# Patient Record
Sex: MALE | Race: WHITE | NOT HISPANIC OR LATINO | Employment: FULL TIME | URBAN - METROPOLITAN AREA
[De-identification: names, ages, dates, MRNs, and addresses within clinical notes are randomized per-mention and may not be internally consistent; named-entity substitution may affect disease eponyms.]

---

## 2018-05-14 ENCOUNTER — HOSPITAL ENCOUNTER (OUTPATIENT)
Facility: HOSPITAL | Age: 53
Setting detail: OBSERVATION
Discharge: HOME | End: 2018-05-14
Attending: STUDENT IN AN ORGANIZED HEALTH CARE EDUCATION/TRAINING PROGRAM | Admitting: INTERNAL MEDICINE
Payer: COMMERCIAL

## 2018-05-14 ENCOUNTER — ANESTHESIA EVENT (OUTPATIENT)
Dept: ENDOSCOPY | Facility: HOSPITAL | Age: 53
Setting detail: OBSERVATION
End: 2018-05-14
Payer: COMMERCIAL

## 2018-05-14 ENCOUNTER — APPOINTMENT (EMERGENCY)
Dept: RADIOLOGY | Facility: HOSPITAL | Age: 53
End: 2018-05-14
Attending: STUDENT IN AN ORGANIZED HEALTH CARE EDUCATION/TRAINING PROGRAM
Payer: COMMERCIAL

## 2018-05-14 VITALS
BODY MASS INDEX: 34.88 KG/M2 | OXYGEN SATURATION: 98 % | HEART RATE: 108 BPM | RESPIRATION RATE: 18 BRPM | TEMPERATURE: 97.8 F | WEIGHT: 280.5 LBS | SYSTOLIC BLOOD PRESSURE: 154 MMHG | DIASTOLIC BLOOD PRESSURE: 89 MMHG | HEIGHT: 75 IN

## 2018-05-14 DIAGNOSIS — I48.19 PERSISTENT ATRIAL FIBRILLATION (CMS/HCC): ICD-10-CM

## 2018-05-14 DIAGNOSIS — I48.91 ATRIAL FIBRILLATION WITH RVR (CMS/HCC): Primary | ICD-10-CM

## 2018-05-14 DIAGNOSIS — I10 ESSENTIAL HYPERTENSION: Chronic | ICD-10-CM

## 2018-05-14 PROBLEM — E78.5 HYPERLIPEMIA: Chronic | Status: ACTIVE | Noted: 2018-05-14

## 2018-05-14 PROBLEM — E87.6 HYPOKALEMIA: Status: ACTIVE | Noted: 2018-05-14

## 2018-05-14 LAB
ANION GAP SERPL CALC-SCNC: 8 MEQ/L (ref 3–15)
APTT PPP: 36 SEC. (ref 23–35)
ATRIAL RATE: 141
BASOPHILS # BLD: 0.05 K/UL (ref 0.01–0.1)
BASOPHILS NFR BLD: 0.5 %
BUN SERPL-MCNC: 25 MG/DL (ref 8–20)
CALCIUM SERPL-MCNC: 9.3 MG/DL (ref 8.9–10.3)
CHLORIDE SERPL-SCNC: 104 MMOL/L (ref 98–109)
CO2 SERPL-SCNC: 26 MMOL/L (ref 22–32)
CREAT SERPL-MCNC: 0.9 MG/DL (ref 0.8–1.3)
D DIMER PPP IA.FEU-MCNC: <0.27 ÆG/ML (ref 0–0.5)
DIFFERENTIAL METHOD BLD: NORMAL
EOSINOPHIL # BLD: 0.28 K/UL (ref 0.04–0.54)
EOSINOPHIL NFR BLD: 3 %
ERYTHROCYTE [DISTWIDTH] IN BLOOD BY AUTOMATED COUNT: 13.4 % (ref 11.6–14.4)
GFR SERPL CREATININE-BSD FRML MDRD: >60 ML/MIN/1.73M*2
GLUCOSE SERPL-MCNC: 131 MG/DL (ref 70–99)
HCT VFR BLDCO AUTO: 44.8 % (ref 40–51)
HGB BLD-MCNC: 15.3 G/DL (ref 13.7–17.5)
IMM GRANULOCYTES # BLD AUTO: 0.03 K/UL (ref 0–0.08)
IMM GRANULOCYTES NFR BLD AUTO: 0.3 %
INR PPP: 0.9 INR
LYMPHOCYTES # BLD: 3.27 K/UL (ref 1.2–3.5)
LYMPHOCYTES NFR BLD: 34.5 %
MCH RBC QN AUTO: 29.7 PG (ref 28–33.2)
MCHC RBC AUTO-ENTMCNC: 34.2 G/DL (ref 32.2–36.5)
MCV RBC AUTO: 87 FL (ref 83–98)
MONOCYTES # BLD: 0.89 K/UL (ref 0.3–1)
MONOCYTES NFR BLD: 9.4 %
NEUTROPHILS # BLD: 4.96 K/UL (ref 1.7–7)
NEUTS SEG NFR BLD: 52.3 %
NRBC BLD-RTO: 0 %
PDW BLD AUTO: 9.6 FL (ref 9.4–12.4)
PLATELET # BLD AUTO: 235 K/UL (ref 150–350)
POTASSIUM SERPL-SCNC: 3.2 MMOL/L (ref 3.6–5.1)
PROTHROMBIN TIME: 12 SEC. (ref 12.2–14.5)
QRS DURATION: 94
QT INTERVAL: 270
QTC CALCULATION(BAZETT): 418
R AXIS: -1
RBC # BLD AUTO: 5.15 M/UL (ref 4.5–5.8)
SODIUM SERPL-SCNC: 138 MMOL/L (ref 136–144)
T WAVE AXIS: 19
TROPONIN I SERPL-MCNC: <0.02 NG/ML
TROPONIN I SERPL-MCNC: <0.02 NG/ML
TSH SERPL DL<=0.05 MIU/L-ACNC: 2.51 UIU/ML (ref 0.34–5.6)
VENTRICULAR RATE: 144
WBC # BLD AUTO: 9.48 K/UL (ref 3.8–10.5)

## 2018-05-14 PROCEDURE — 36415 COLL VENOUS BLD VENIPUNCTURE: CPT | Performed by: INTERNAL MEDICINE

## 2018-05-14 PROCEDURE — 63700000 HC SELF-ADMINISTRABLE DRUG: Performed by: NURSE PRACTITIONER

## 2018-05-14 PROCEDURE — 99217 PR OBSERVATION CARE DISCHARGE MANAGEMENT: CPT | Performed by: HOSPITALIST

## 2018-05-14 PROCEDURE — 25800000 HC PHARMACY IV SOLUTIONS: Performed by: STUDENT IN AN ORGANIZED HEALTH CARE EDUCATION/TRAINING PROGRAM

## 2018-05-14 PROCEDURE — 85610 PROTHROMBIN TIME: CPT | Performed by: STUDENT IN AN ORGANIZED HEALTH CARE EDUCATION/TRAINING PROGRAM

## 2018-05-14 PROCEDURE — 36415 COLL VENOUS BLD VENIPUNCTURE: CPT | Performed by: STUDENT IN AN ORGANIZED HEALTH CARE EDUCATION/TRAINING PROGRAM

## 2018-05-14 PROCEDURE — 85730 THROMBOPLASTIN TIME PARTIAL: CPT | Performed by: STUDENT IN AN ORGANIZED HEALTH CARE EDUCATION/TRAINING PROGRAM

## 2018-05-14 PROCEDURE — 92960 CARDIOVERSION ELECTRIC EXT: CPT | Performed by: INTERNAL MEDICINE

## 2018-05-14 PROCEDURE — 93005 ELECTROCARDIOGRAM TRACING: CPT | Performed by: STUDENT IN AN ORGANIZED HEALTH CARE EDUCATION/TRAINING PROGRAM

## 2018-05-14 PROCEDURE — 84443 ASSAY THYROID STIM HORMONE: CPT | Performed by: STUDENT IN AN ORGANIZED HEALTH CARE EDUCATION/TRAINING PROGRAM

## 2018-05-14 PROCEDURE — G0378 HOSPITAL OBSERVATION PER HR: HCPCS

## 2018-05-14 PROCEDURE — 96365 THER/PROPH/DIAG IV INF INIT: CPT

## 2018-05-14 PROCEDURE — 85379 FIBRIN DEGRADATION QUANT: CPT | Performed by: STUDENT IN AN ORGANIZED HEALTH CARE EDUCATION/TRAINING PROGRAM

## 2018-05-14 PROCEDURE — 84484 ASSAY OF TROPONIN QUANT: CPT | Performed by: STUDENT IN AN ORGANIZED HEALTH CARE EDUCATION/TRAINING PROGRAM

## 2018-05-14 PROCEDURE — 25000000 HC PHARMACY GENERAL: Performed by: NURSE PRACTITIONER

## 2018-05-14 PROCEDURE — 200200 PR NO CHARGE: Performed by: HOSPITALIST

## 2018-05-14 PROCEDURE — 71045 X-RAY EXAM CHEST 1 VIEW: CPT

## 2018-05-14 PROCEDURE — 99285 EMERGENCY DEPT VISIT HI MDM: CPT | Mod: 25

## 2018-05-14 PROCEDURE — 84484 ASSAY OF TROPONIN QUANT: CPT | Mod: 91 | Performed by: INTERNAL MEDICINE

## 2018-05-14 PROCEDURE — 25000000 HC PHARMACY GENERAL: Performed by: STUDENT IN AN ORGANIZED HEALTH CARE EDUCATION/TRAINING PROGRAM

## 2018-05-14 PROCEDURE — 80048 BASIC METABOLIC PNL TOTAL CA: CPT | Performed by: STUDENT IN AN ORGANIZED HEALTH CARE EDUCATION/TRAINING PROGRAM

## 2018-05-14 PROCEDURE — 85025 COMPLETE CBC W/AUTO DIFF WBC: CPT | Performed by: STUDENT IN AN ORGANIZED HEALTH CARE EDUCATION/TRAINING PROGRAM

## 2018-05-14 PROCEDURE — 37000001 HC ANESTHESIA GENERAL: Performed by: INTERNAL MEDICINE

## 2018-05-14 PROCEDURE — 96366 THER/PROPH/DIAG IV INF ADDON: CPT | Mod: 59

## 2018-05-14 PROCEDURE — 96376 TX/PRO/DX INJ SAME DRUG ADON: CPT | Mod: 59

## 2018-05-14 PROCEDURE — 93005 ELECTROCARDIOGRAM TRACING: CPT | Mod: 59 | Performed by: INTERNAL MEDICINE

## 2018-05-14 PROCEDURE — 99220 PR INITIAL OBSERVATION CARE/DAY 70 MINUTES: CPT | Performed by: INTERNAL MEDICINE

## 2018-05-14 RX ORDER — POTASSIUM CHLORIDE 750 MG/1
40 TABLET, FILM COATED, EXTENDED RELEASE ORAL ONCE
Status: COMPLETED | OUTPATIENT
Start: 2018-05-14 | End: 2018-05-14

## 2018-05-14 RX ORDER — HYDROCHLOROTHIAZIDE 12.5 MG/1
12.5 CAPSULE ORAL EVERY MORNING
Refills: 3 | COMMUNITY
Start: 2018-03-11 | End: 2019-04-24

## 2018-05-14 RX ORDER — CLINDAMYCIN PHOSPHATE 10 MG/G
GEL TOPICAL
Refills: 3 | COMMUNITY
Start: 2018-05-07 | End: 2019-04-24

## 2018-05-14 RX ORDER — ATORVASTATIN CALCIUM 40 MG/1
40 TABLET, FILM COATED ORAL
Refills: 2 | COMMUNITY
Start: 2018-03-12

## 2018-05-14 RX ORDER — DILTIAZEM HCL IN NACL,ISO-OSM 125 MG/125
5-15 PLASTIC BAG, INJECTION (ML) INTRAVENOUS
Status: DISCONTINUED | OUTPATIENT
Start: 2018-05-14 | End: 2018-05-14

## 2018-05-14 RX ORDER — ATORVASTATIN CALCIUM 40 MG/1
40 TABLET, FILM COATED ORAL
Status: DISCONTINUED | OUTPATIENT
Start: 2018-05-14 | End: 2018-05-14 | Stop reason: HOSPADM

## 2018-05-14 RX ORDER — AMLODIPINE BESYLATE 5 MG/1
5 TABLET ORAL
Refills: 3 | COMMUNITY
Start: 2018-05-07

## 2018-05-14 RX ORDER — DEXTROSE 50 % IN WATER (D50W) INTRAVENOUS SYRINGE
25 AS NEEDED
Status: DISCONTINUED | OUTPATIENT
Start: 2018-05-14 | End: 2018-05-14 | Stop reason: HOSPADM

## 2018-05-14 RX ORDER — DEXTROSE 40 %
15-30 GEL (GRAM) ORAL AS NEEDED
Status: DISCONTINUED | OUTPATIENT
Start: 2018-05-14 | End: 2018-05-14 | Stop reason: HOSPADM

## 2018-05-14 RX ORDER — CLINDAMYCIN PHOSPHATE 10 MG/G
GEL TOPICAL 2 TIMES DAILY
Status: DISCONTINUED | OUTPATIENT
Start: 2018-05-14 | End: 2018-05-14

## 2018-05-14 RX ORDER — ENOXAPARIN SODIUM 100 MG/ML
1 INJECTION SUBCUTANEOUS
Status: DISCONTINUED | OUTPATIENT
Start: 2018-05-14 | End: 2018-05-14

## 2018-05-14 RX ORDER — AMLODIPINE BESYLATE 5 MG/1
5 TABLET ORAL DAILY
Status: DISCONTINUED | OUTPATIENT
Start: 2018-05-14 | End: 2018-05-14

## 2018-05-14 RX ORDER — DILTIAZEM HYDROCHLORIDE 5 MG/ML
20 INJECTION INTRAVENOUS ONCE
Status: COMPLETED | OUTPATIENT
Start: 2018-05-14 | End: 2018-05-14

## 2018-05-14 RX ORDER — IBUPROFEN 200 MG
16-32 TABLET ORAL AS NEEDED
Status: DISCONTINUED | OUTPATIENT
Start: 2018-05-14 | End: 2018-05-14 | Stop reason: HOSPADM

## 2018-05-14 RX ORDER — DILTIAZEM HCL IN NACL,ISO-OSM 125 MG/125
10 PLASTIC BAG, INJECTION (ML) INTRAVENOUS
Status: DISCONTINUED | OUTPATIENT
Start: 2018-05-14 | End: 2018-05-14

## 2018-05-14 RX ORDER — MOMETASONE FUROATE 1 MG/G
CREAM TOPICAL
Refills: 0 | COMMUNITY
Start: 2018-04-15 | End: 2019-04-24

## 2018-05-14 RX ADMIN — Medication 15 MG/HR: at 08:21

## 2018-05-14 RX ADMIN — DILTIAZEM HYDROCHLORIDE 20 MG: 5 INJECTION INTRAVENOUS at 03:21

## 2018-05-14 RX ADMIN — POTASSIUM CHLORIDE 40 MEQ: 750 TABLET, FILM COATED, EXTENDED RELEASE ORAL at 11:24

## 2018-05-14 RX ADMIN — SODIUM CHLORIDE 1000 ML: 9 INJECTION, SOLUTION INTRAVENOUS at 03:17

## 2018-05-14 RX ADMIN — Medication 15 MG/HR: at 10:26

## 2018-05-14 RX ADMIN — Medication 5 MG/HR: at 03:20

## 2018-05-14 ASSESSMENT — COGNITIVE AND FUNCTIONAL STATUS - GENERAL
HELP NEEDED FOR BATHING: 4 - NONE
MOVING TO AND FROM BED TO CHAIR: 4 - NONE
TOILETING: 4 - NONE
CLIMB 3 TO 5 STEPS WITH RAILING: 4 - NONE
DRESSING REGULAR UPPER BODY CLOTHING: 4 - NONE
WALKING IN HOSPITAL ROOM: 4 - NONE
HELP NEEDED FOR PERSONAL GROOMING: 4 - NONE
DRESSING REGULAR LOWER BODY CLOTHING: 4 - NONE
STANDING UP FROM CHAIR USING ARMS: 4 - NONE
EATING MEALS: 4 - NONE

## 2018-05-14 ASSESSMENT — ENCOUNTER SYMPTOMS
NECK PAIN: 0
DYSRHYTHMIAS: 1
HEMATURIA: 0
DIZZINESS: 0
DYSURIA: 0
FEVER: 0
VOMITING: 0
SHORTNESS OF BREATH: 0
SORE THROAT: 0
DIARRHEA: 0
HEADACHES: 0
COUGH: 0
BACK PAIN: 0
WEAKNESS: 0
RHINORRHEA: 0
NAUSEA: 0
EYE PAIN: 0
PALPITATIONS: 1
ABDOMINAL PAIN: 0
CHILLS: 0

## 2018-05-14 ASSESSMENT — PAIN SCALES - GENERAL: PAIN_LEVEL: 0

## 2018-05-14 NOTE — NURSING NOTE
Pt discharged to home, discharge instructions reviewed with pt, prescription and medication provided to pt, pt verbalized understanding of all instructions. IV access and monitor removed from pt

## 2018-05-14 NOTE — ASSESSMENT & PLAN NOTE
Admitted to telemetry in rapid afib with diltiazem infusion  TSH within normal limits  D/w Dr. May -plan was for cardioversion however patient converted to normal sinus rhythm without intervention.  Recommend anticoagulation for 1 month, cardiology will provide patient with Xarelto  Patient stable for discharge home will follow up with Dr. May for outpatient echocardiogram

## 2018-05-14 NOTE — ASSESSMENT & PLAN NOTE
Admit to telemetry and continue monitoring  Monitor BP per routine  TSH within normal limits  Will continue rate control with diltiazem infusion.   Cardiology consult  ECHO in the morning  NPO for possible CORTEZ/CV  Lovenox for anticoagulation at this time

## 2018-05-14 NOTE — ANESTHESIA POSTPROCEDURE EVALUATION
Patient: Usman Gottlieb    Procedure Summary     Date:  05/14/18 Room / Location:  [facility] GI 3 / [facility]    Anesthesia Start:  1216 Anesthesia Stop:  1223    Procedure:  CARDIOVERSION (N/A Chest) Diagnosis:  (a-fib)    Provider:  Ezequiel May MD Responsible Provider:  Ana Mantilla MD    Anesthesia Type:  general ASA Status:  2 - Emergent          Anesthesia Type: general  PACU Vitals     No data found.            Anesthesia Post Evaluation    Pain score: 0  Pain management: adequate  Patient participation: complete - patient participated  Level of consciousness: awake and alert  Cardiovascular status: acceptable  Airway Patency: adequate  Respiratory status: acceptable  Hydration status: acceptable  Anesthetic complications: no

## 2018-05-14 NOTE — PROGRESS NOTES
5/14/2018- RN- CC- Pt is scheduled for cardioversion today. Spoke with spouse who confimred pharmacy information. Xarelto will be given by Dr. Green office x 1 month. ORLIN Ann RN pager [phone]

## 2018-05-14 NOTE — ASSESSMENT & PLAN NOTE
He presents in new onset AF that began at 1:30am today. His CHADS2 VASC2= for Htn. Will keep NPO for a Cardioversion today. Continue Cardizem. There are no contraindications to anticoagulation. Will start Xarelto 20mg daily.

## 2018-05-14 NOTE — PERIOPERATIVE NURSING NOTE
Pt converted on his own after hooked up to the monitor in the procedure room. Pre anesthesia. Dr May at bedside. Procedure cancelled. Will do EKG in recovery area.

## 2018-05-14 NOTE — DISCHARGE INSTRUCTIONS
BLOOD THINNER GUIDELINES    You have been prescribed a blood thinner called xarelto.    If you experience bleeding (including blood in urine, uncontrolled nose bleed, blood in stool, or black stools), you need to seek medical attention.    Avoid drinking alcohol or using NSAIDs (ibuprofen, motrin, naproxen, aleve), as they may increase your risk of bleeding.

## 2018-05-14 NOTE — ASSESSMENT & PLAN NOTE
BP is well controlled on IV Cardizem currently. Will change amlodipine to po Cardizem at discharge.

## 2018-05-14 NOTE — CONSULTS
"Cardiology Consult Note        Subjective     Usman Gottlieb is a 53 y.o. male who was admitted for AF (atrial fibrillation) (CMS/HCC) (HCC) [I48.91]  Atrial fibrillation with RVR (CMS/HCC) (HCC) [I48.91]. Usman Gottlieb was referred by Dr. Branham for management recommendations. Usman Gottlieb is a 53 year-old male with a PMHx of Htn, HLD, borderline DM who presents with palpitations. The symptoms woke him from sleep at 1:30am today and have been persistent. He denies chest pain, SOB, dizziness. He has had brief palpitations rarely over the past year. He may have had one episode lasting 30 minutes in the distant past. No recent fever, chills, weight loss, fatigue.     Outside records reviewed..    Past Medical History:   Diagnosis Date   • Folliculitis    • Hyperlipemia    • Hypertension    • Type 2 diabetes mellitus (CMS/HCC) (HCC)     \"borderline\" per pt - diet controlled       Past Surgical History:   Procedure Laterality Date   • KNEE SURGERY Bilateral    • VASECTOMY         Social History     Social History Narrative   • No narrative on file       Family History   Problem Relation Age of Onset   • Hyperlipidemia Mother    • Hypertension Mother        Patient has no known allergies.    Current Facility-Administered Medications   Medication Dose Route Frequency Provider Last Rate Last Dose   • atorvastatin (LIPITOR) tablet 40 mg  40 mg oral Daily (6a) Rasta Viera MD       • glucose chewable tablet 16-32 g of dextrose  16-32 g of dextrose oral PRN Rasta Viera MD        Or   • dextrose 40 % oral gel 15-30 g of dextrose  15-30 g of dextrose oral PRN Rasta Viera MD        Or   • glucagon (GLUCAGEN) injection 1 mg  1 mg intramuscular PRN Rasta Viera MD        Or   • dextrose in water injection 12.5 g  25 mL intravenous PRN Rasta Viera MD       • dilTIAZem HCl in 0.9% NaCl (CARDIZEM) 125 mg/125 mL (1 mg/mL) infusion  5-15 mg/hr intravenous Titrated MERY Hernandez 15 mL/hr at " "05/14/18 1026 15 mg/hr at 05/14/18 1026   • enoxaparin (LOVENOX) syringe 120 mg  1 mg/kg subcutaneous q12h (6a, 6p) Rasta Viera MD           Review of Systems  Pertinent items are noted in HPI.    Objective     Physical Exam  BP (!) 140/97   Pulse (!) 115   Temp 36.7 °C (98.1 °F) (Oral)   Resp 18   Ht 1.905 m (6' 3\")   Wt 127 kg (280 lb 8 oz)   SpO2 97%   BMI 35.06 kg/m²     The patient appears comfortable and is in no apparent distress,     Eyes: Eyelids and sclera normal,    Neck: No jugular venous distention, no thyromegaly, no lymphadenopathy, no carotid bruits,     Lungs: Clear to auscultation, normal respiratory effort,    Heart: Irregularly irregular, normal S1 and S2, no murmurs rubs or gallops,    Abdomen: Soft, nontender, obese, NABS x 4    Extremities: No edema, no calf tenderness or swelling, pulses intact,    Skin: No rashes,    Neuro: Alert and oriented without focal deficit,    Psych: Affect normal.      Labs   Lab Results   Component Value Date    WBC 9.48 05/14/2018    HGB 15.3 05/14/2018    HCT 44.8 05/14/2018     05/14/2018     05/14/2018    K 3.2 (L) 05/14/2018     05/14/2018    CREATININE 0.9 05/14/2018    BUN 25 (H) 05/14/2018    CO2 26 05/14/2018    TSH 2.51 05/14/2018    INR 0.9 05/14/2018         Imaging  I have independently reviewed the pertinent imaging from the last 24 hrs.  TTE 2012: wnl  Normal test. Nuclear STS 2012    ECG   ECG is pending    Telemetry  atrial fibrillation, with ventricular rate of  bpm on a Cardizem gtt at 15mg/hr      Assessment     * AF (atrial fibrillation) (CMS/HCC) (HCC)   Assessment & Plan    He presents in new onset AF that began at 1:30am today. His CHADS2 VASC2= for Htn. Will keep NPO for a Cardioversion today. Continue Cardizem. There are no contraindications to anticoagulation. Will start Xarelto 20mg daily.         Hypertension   Assessment & Plan    BP is well controlled on IV Cardizem currently. Will change " amlodipine to po Cardizem at discharge.         Hyperlipemia   Assessment & Plan    Cont. Statin.                 COLTON Jacobo  5/14/2018

## 2018-05-14 NOTE — DISCHARGE SUMMARY
Hospital Medicine Service -  Inpatient Discharge Summary        BRIEF OVERVIEW   Admitting Provider: Rasta Viera MD  Attending Provider: Shanice Branham DO Attending phys phone: [phone]    PCP: Navdeep Allen [phone]    Admission Date: 5/14/2018  Discharge Date: 5/14/2018     DISCHARGE DIAGNOSES      Primary Discharge Diagnosis  AF (atrial fibrillation) (CMS/[facility]) ([facility])    Secondary Discharge Diagnoses  Active Hospital Problems    Diagnosis Date Noted   • AF (atrial fibrillation) (CMS/HCC) ([facility]) 05/14/2018     Priority: High   • Hypertension 05/14/2018   • Hyperlipemia 05/14/2018   • Hypokalemia 05/14/2018      Resolved Hospital Problems    Diagnosis Date Noted Date Resolved   No resolved problems to display.       Problem List on Day of Discharge  * AF (atrial fibrillation) (CMS/[facility]) ([facility])   Assessment & Plan    Admitted to telemetry in rapid afib with diltiazem infusion  TSH within normal limits  D/w Dr. May -plan was for cardioversion however patient converted to normal sinus rhythm without intervention.  Recommend anticoagulation for 1 month, cardiology will provide patient with Xarelto  Patient stable for discharge home will follow up with Dr. May for outpatient echocardiogram            Hypokalemia   Assessment & Plan    K 3.2 - given 40mEq Kdur        Hyperlipemia   Assessment & Plan    Continue statin        Hypertension   Assessment & Plan    BP stable  Resume usual outpatient medications on discharge          SUMMARY OF HOSPITALIZATION      Presenting Problem/History of Present Illness  AF (atrial fibrillation) (CMS/[facility]) ([facility])    This is a 53 y.o. year-old male admitted on 5/14/2018 with AF (atrial fibrillation) (CMS/[facility]) ([facility]) [I48.91]  Atrial fibrillation with RVR (CMS/[facility]) ([facility]) [I48.91].      Hospital Course  53-year-old male who presented to [facility] emergency room early this morning complaining of palpitations that woke him from sleep.  In the  emergency room he was noted to be in atrial fibrillation with rapid ventricular response.  He was started on a diltiazem drip.  Cardiology was consulted and cardioversion was planned.  However patient converted to normal sinus rhythm in the holding area.  Per cardiology recommendations patient is stable for discharge home.  He should continue on oral anticoagulation for 1 month.  He is to follow-up with Dr. May this Friday, May 18, 2018 for an appointment and outpatient echocardiogram.    Exam on Day of Discharge  Patient seen and examined earlier this morning.    Physical Exam   General - No acute distress  Neuro - AAO x 3, nonfocal  CV- irregular, irregular, Afib on telemetry  Resp - Lungs CTA; No rales, rhonchi, or wheeze  GI- +BS, SNT  Extremities - no edema, clubbing or edema  Skin- no rash on exposed skin  Psych - appropriate, cooperative      Consults During Admission  IP CONSULT TO NUTRITION SERVICES  IP CONSULT TO CARDIOLOGY    DISCHARGE MEDICATIONS   New, changed, or stopped medications from this admission:       Medication List      START taking these medications    rivaroxaban 20 mg tablet  Commonly known as:  XARELTO  Take 1 tablet (20 mg total) by mouth daily with dinner.           Where to Get Your Medications      Information about where to get these medications is not yet available    Ask your nurse or doctor about these medications  · rivaroxaban 20 mg tablet         Complete list of medications at discharge:    Usman Gottlieb   Home Medication Instructions BRITTANY:[id number]    Printed on:05/14/18 1314   Medication Information                      amLODIPine (NORVASC) 5 mg tablet  Take 5 mg by mouth once daily.             atorvastatin (LIPITOR) 40 mg tablet  Take 40 mg by mouth once daily.             clindamycin (CLINDAGEL) 1 % gel  APPLY ON THE SKIN DAILY TO AFFECTED AREAS             hydrochlorothiazide (MICROZIDE) 12.5 mg capsule  Take 12.5 mg by mouth every morning.             mometasone  (ELOCON) 0.1 % cream  APPLY EVERY DAY             rivaroxaban (XARELTO) 20 mg tablet  Take 1 tablet (20 mg total) by mouth daily with dinner.                      PROCEDURES / LABS / IMAGING        Pertinent Labs    Results from last 7 days  Lab Units 05/14/18  0315   SODIUM mmol/L 138   POTASSIUM mmol/L 3.2*   CHLORIDE mmol/L 104   CO2 mmol/L 26   BUN mg/dL 25*   CREATININE mg/dL 0.9   GLUCOSE mg/dL 131*   CALCIUM mg/dL 9.3         Results from last 7 days  Lab Units 05/14/18  0315   WBC K/uL 9.48   HEMOGLOBIN g/dL 15.3   HEMATOCRIT % 44.8   PLATELETS K/uL 235           Pertinent Imaging  X-ray Chest 1 View    Result Date: 5/14/2018  IMPRESSION: No active disease is seen in the chest.      OUTPATIENT  FOLLOW-UP / REFERRALS / PENDING TESTS        Outpatient Follow-Up Appointments  PCP  Dr. May          DISCHARGE DISPOSITION      Disposition: Home     Code Status At Discharge: Full Code    Physician Order for Life-Sustaining Treatment Document Status      No documents found

## 2018-05-14 NOTE — PROGRESS NOTES
Daily Progress Note    Subjective    Patient seen and examined earlier this morning.  At rest his heart rate is elevated, up to 140's.  He reports feeling palpitations.  Denies any dizziness, lightheadedness, chest pain or nausea.  Denies any shortness of breath.  Patient is on Cardizem drip at 12.5 mg /hr which I have instructed the nurse to increase to 15 mg/hr.    Objective    Vital signs in last 24 hours:  Temp:  [36.7 °C (98.1 °F)-37.1 °C (98.7 °F)] 36.7 °C (98.1 °F)  Heart Rate:  [100-138] 115  Resp:  [16-20] 18  BP: (105-179)/(61-97) 140/97      Physical Exam:  General - No acute distress  Neuro - AAO x 3, nonfocal  CV- Irreg, Irreg. Tachycardia  Resp - Lungs CTA; No rales, rhonchi, or wheeze  GI- +BS, SNT  Extremities - no edema, clubbing or edema  Skin- no rash on exposed skin  Psych - appropriate, cooperative    Labs  K 3.2 - Ordered 40 mEq Kdur    ECG/Telemetry  I have independently reviewed the telemetry. Significant findings include Afib, heart rates up to 140.    VTE Assessment: I have reassessed and the patient's VTE risk and treatment plan is appropriate.       Assessment & Plan    1. New onset Afib - continue cardizem drip.  D/w Cardiology - for Cardioversion today.  Plan to start Xarelto - I have given a prescription to case management to verify patient's co-pay.  Continue to monitor on telemetry.    2. Hypertension - BP controlled on cardizem drip.  Per Cardiology recommendations, will change amlodipine to oral cardizem on discharge.    3. Hyperlipemia - continue statin    Expected Discharge Date:  5/16/2018

## 2018-05-14 NOTE — PLAN OF CARE
Problem: Patient Care Overview  Goal: Plan of Care Review  Outcome: Ongoing (interventions implemented as appropriate)   05/14/18 0900 05/14/18 0940   Plan of Care Review   Progress --  progress towards functional goals is fair   Outcome Summary --  Pt educated on dose, purpose and side effects of medication    Coping/Psychosocial   Plan Of Care Reviewed With patient --      Goal: Individualization & Mutuality  Outcome: Ongoing (interventions implemented as appropriate)    Goal: Discharge Needs Assessment  Outcome: Ongoing (interventions implemented as appropriate)    Goal: Interprofessional Rounds/Family Conf  Outcome: Ongoing (interventions implemented as appropriate)      Problem: Pain, Acute (Adult)  Goal: Identify Related Risk Factors and Signs and Symptoms  Outcome: Outcome(s) Achieved Date Met: 05/14/18    Goal: Acceptable Pain Control/Comfort Level  Outcome: Ongoing (interventions implemented as appropriate)

## 2018-05-14 NOTE — ED PROVIDER NOTES
HPI     Chief Complaint   Patient presents with   • Palpitations     Pt woke up 1.5 hours ago from a bad dream and felt his heart racing. Pt feels better now.       54 y/o M w/ PMHx of HLD, HTN, presents to ED for evaluation palpitations. Pt woke up 1.5 hours ago from a dream and felt his heart racing. Pt is not followed by a cardiologist; states htn/hld followed by PCP. Pt states he drinks 5 cups of coffee a day. 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 Denies h/o similar situation. Denies pain, lightheadedness, sob, headache or dizziness. Denies CP, SOB, abd pain, N/V/D, fever, chills or any other concerns.       History provided by:  Patient  Palpitations   Palpitations quality:  Irregular  Onset quality:  Sudden  Duration: 1.5 hours.  Timing:  Constant  Progression:  Worsening  Chronicity:  New  Relieved by:  Nothing  Worsened by:  Nothing  Ineffective treatments:  None tried  Associated symptoms: no back pain, no chest pain, no cough, no dizziness, no nausea, no shortness of breath, no vomiting and no weakness         Patient History     Past Medical History:   Diagnosis Date   • Hyperlipemia    • Hypertension        Past Surgical History:   Procedure Laterality Date   • KNEE SURGERY Bilateral    • VASECTOMY         History reviewed. No pertinent family history.    Social History   Substance Use Topics   • Smoking status: Never Smoker   • Smokeless tobacco: Never Used   • Alcohol use Yes      Comment: occasionally       Systems Reviewed from Nursing Triage:  Problems          Review of Systems     Review of Systems   Constitutional: Negative for chills and fever.   HENT: Negative for ear pain, rhinorrhea and sore throat.    Eyes: Negative for pain.   Respiratory: Negative for cough and shortness of breath.    Cardiovascular: Positive for palpitations. Negative for chest pain.   Gastrointestinal: Negative for abdominal pain, diarrhea, nausea and vomiting.   Genitourinary: Negative for dysuria and  "hematuria.   Musculoskeletal: Negative for back pain and neck pain.   Skin: Negative for rash.   Neurological: Negative for dizziness, weakness and headaches.   All other systems reviewed and are negative.       Physical Exam     ED Triage Vitals [05/14/18 0259]   Temp Pulse Resp BP SpO2   37.1 °C (98.7 °F) -- 20 -- 99 %      Temp Source Heart Rate Source Patient Position BP Location FiO2 (%) (Set)   Tympanic -- -- -- --       Pulse Ox %: 97 % (05/14/18 0302)  Pulse Ox Interpretation: Normal (05/14/18 0302)  Heart Rate: 152 (05/14/18 0302)       Patient Vitals for the past 24 hrs:   Temp Temp src Resp SpO2 Height Weight   05/14/18 0259 37.1 °C (98.7 °F) Tympanic 20 99 % 1.905 m (6' 3\") 122 kg (270 lb)           Physical Exam   Constitutional: He is oriented to person, place, and time. Vital signs are normal. He appears well-developed and well-nourished. No distress.   HENT:   Head: Normocephalic and atraumatic.   Nose: Nose normal.   Mouth/Throat: Mucous membranes are normal.   Eyes: EOM are normal. Pupils are equal, round, and reactive to light.   Neck: Normal range of motion. Neck supple. No JVD present.   Cardiovascular: Intact distal pulses.  An irregularly irregular rhythm present.   No murmur heard.  Irregularly irregular. No m/r/g.    Pulmonary/Chest: Breath sounds normal. No respiratory distress. He has no decreased breath sounds. He has no wheezes. He has no rhonchi. He has no rales.   Abdominal: Soft. Normal appearance and bowel sounds are normal. There is no tenderness. There is no CVA tenderness.   Musculoskeletal: Normal range of motion. He exhibits no tenderness.   Neurological: He is alert and oriented to person, place, and time. He has normal strength.   Skin: Skin is warm and dry. Capillary refill takes less than 2 seconds. No rash noted. No pallor.   Psychiatric: He has a normal mood and affect. His behavior is normal.   Nursing note and vitals reviewed.           Procedures    ED Course & MDM "     Labs Reviewed - No data to display    No orders to display           MDM  Scribe Attestation   By signing my name below, I, Eduarda Colon, attest that this documentation has been prepared under the direction and in the presence of Gustabo Ramírez DO.    5/14/2018 3:02 AM     Provider Attestation   I, Gustabo Ramírez, personally performed the services described in this documentation, as documented by the scribe in my presence, and it is both accurate and complete.  5/14/2018 11:56 AM           ED Course as of May 14 1200   Mon May 14, 2018   0306 Seen and evaluated. Pt in afib with rvr. New onset for patient. Otherwise appears comfortable. Cardizem bolus and gtt ordered. ED course described to pt and questions/concerns addressed.   [NS]   0306 EKG 02:58 - afib 144 bpm, nl axis, good rwp, nonspecific st/t wave changes likely rate rated, qt/qtc 270/418 ms.   [NS]   0426 Pt remains tachycardic. Increase cardizem to 10 mg/hr; RN aware.   [NS]   0427 All results reviewed with patient. Agrees with continued treatment plan and hospitalization. [facility] notified for admission and accepts to service.   [NS]   0430 Critical care - 30 minutes  [NS]      ED Course User Index  [NS] Gustabo Ramírez DO         Clinical Impressions as of May 14 1200   Atrial fibrillation with RVR (CMS/HCC) ([facility])     Disposition:       Eduarda Colon  05/14/18 0304       Gustaob Ramírez DO  05/14/18 1200

## 2018-05-14 NOTE — ANESTHESIA PREPROCEDURE EVALUATION
Anesthesia ROS/MED HX    Anesthesia History    Previous anesthetics (knee scopes, colonoscopy, vasectomy)  Pulmonary    Sleep apnea (possible)  Neuro/Psych - neg  Cardiovascular   ECG reviewed  Abnormal ECG   hypertension   Dysrhythmias (rapid AFIB)  GI/Hepatic- neg  Musculoskeletal- neg  Endo/Other   Diabetes   Body Habitus: Obese      Past Surgical History:   Procedure Laterality Date   • ELBOW RECONSTRUCTION     • KNEE SURGERY Bilateral    • VASECTOMY         Physical Exam    Airway   Mallampati: II   TM distance: >3 FB   Neck ROM: full  Cardiovascular    Rhythm: irregular   Rate: abnormal  Pulmonary - normal   clear to auscultation  Dental - normal        Anesthesia Plan    Plan: general    Technique: total IV anesthesia     Lines and Monitors: PIV     Airway: natural airway / supplemental oxygen   ASA 2 - emergent  Blood Products:   Use of Blood Products Discussed: No   Anesthetic plan and risks discussed with: patient  Induction:    intravenous   Postop Plan:   Patient Disposition: inpatient floor planned admission   Pain Management: IV analgesics

## 2018-05-14 NOTE — H&P
Hospital Medicine Service -  History & Physical        CHIEF COMPLAINT     Heart pounding sensation     HISTORY OF PRESENT ILLNESS        Usman Gottlieb is a 53 y.o. male with history of hypertension and hyperlipidemia now presents with the above symptoms.  States that he woke up at around 1:30 AM with a sensation of heart pounding and he thought initially that he was having a bad dream and try to walk it out but it persisted which prompted him to come to the ER.  Denies any lightheadedness, chest pain, shortness of breath, similar symptoms in the past.  Knows it 9 but has not been diagnosed with sleep apnea.  2 weeks ago he had a viral illness with an URI symptoms followed by some rash which was again was informed that is secondary to a viral infection.  Currently he still feels palpitations especially when he stands up and lies down the left side.    PAST MEDICAL AND SURGICAL HISTORY        Past Medical History:   Diagnosis Date   • Folliculitis    • Hyperlipemia    • Hypertension        Past Surgical History:   Procedure Laterality Date   • KNEE SURGERY Bilateral    • VASECTOMY         MEDICATIONS        Prior to Admission medications    Medication Sig Start Date End Date Taking? Authorizing Provider   amLODIPine (NORVASC) 5 mg tablet Take 5 mg by mouth once daily. 5/7/18   Historical Provider, MD   atorvastatin (LIPITOR) 40 mg tablet Take 40 mg by mouth once daily. 3/12/18   Historical Provider, MD   clindamycin (CLINDAGEL) 1 % gel APPLY ON THE SKIN DAILY TO AFFECTED AREAS 5/7/18   Historical Provider, MD   hydrochlorothiazide (MICROZIDE) 12.5 mg capsule Take 12.5 mg by mouth every morning. 3/11/18   Historical Provider, MD   mometasone (ELOCON) 0.1 % cream APPLY EVERY DAY 4/15/18   Historical Provider, MD       ALLERGIES        Patient has no known allergies.    FAMILY HISTORY        Family History   Problem Relation Age of Onset   • Hyperlipidemia Mother    • Hypertension Mother        SOCIAL HISTORY     "    Social History     Social History   • Marital status:      Spouse name: N/A   • Number of children: N/A   • Years of education: N/A     Social History Main Topics   • Smoking status: Never Smoker   • Smokeless tobacco: Never Used   • Alcohol use 4.8 oz/week     8 Glasses of wine per week      Comment: 3-4 drinks - twice a week   • Drug use: No   • Sexual activity: Not Asked     Other Topics Concern   • None     Social History Narrative   • None       REVIEW OF SYSTEMS      All other systems reviewed and negative except as noted in HPI    PHYSICAL EXAMINATION        /61 (BP Location: Right upper arm, Patient Position: Lying)   Pulse (!) 118   Temp 37.1 °C (98.7 °F) (Tympanic)   Resp 16   Ht 1.905 m (6' 3\")   Wt 122 kg (270 lb)   SpO2 98%   BMI 33.75 kg/m²   Body mass index is 33.75 kg/m².  Physical Exam   Constitutional: He is oriented to person, place, and time. He appears well-developed and well-nourished.   HENT:   Head: Normocephalic and atraumatic.   Eyes: EOM are normal. Pupils are equal, round, and reactive to light.   Neck: Neck supple.   Cardiovascular:   Irregular rhythm with rapid heart rates but no murmurs noted   Pulmonary/Chest: Effort normal and breath sounds normal.   Abdominal: Soft. Bowel sounds are normal.   Musculoskeletal: He exhibits no edema.   Neurological: He is alert and oriented to person, place, and time.   Skin: Skin is warm and dry.       LABS / IMAGING / EKG        Labs    Results from last 7 days  Lab Units 05/14/18  0315   WBC K/uL 9.48   HEMOGLOBIN g/dL 15.3   HEMATOCRIT % 44.8   PLATELETS K/uL 235       Results from last 7 days  Lab Units 05/14/18  0315   SODIUM mmol/L 138   POTASSIUM mmol/L 3.2*   CHLORIDE mmol/L 104   CO2 mmol/L 26   BUN mg/dL 25*   CREATININE mg/dL 0.9   GLUCOSE mg/dL 131*   CALCIUM mg/dL 9.3       Imaging  CXR with no acute changes    ECG/Telemetry  I have independently reviewed the ECG. Significant findings include 110, AF with " RVR.    ASSESSMENT AND PLAN           * AF (atrial fibrillation) (CMS/HCC) (HCC)   Assessment & Plan    Admit to telemetry and continue monitoring  Monitor BP per routine  TSH within normal limits  Will continue rate control with diltiazem infusion.   Cardiology consult  ECHO in the morning  NPO for possible CORTEZ/CV  Lovenox for anticoagulation at this time        Hyperlipemia   Assessment & Plan    Continue statin        Hypertension   Assessment & Plan    Holding amlodipine and hydrochlorothiazide while on diltiazem infusion             VTE Assessment: [place] VTE Score: 1  Code Status: Full Code  Estimated discharge date: 5/16/2018     Rasta Viera MD  5/14/2018  6:11 AM

## 2018-05-15 LAB
ATRIAL RATE: 73
P AXIS: 8
PR INTERVAL: 156
QRS DURATION: 104
QT INTERVAL: 416
QTC CALCULATION(BAZETT): 458
R AXIS: 9
T WAVE AXIS: 18
VENTRICULAR RATE: 73

## 2019-04-24 ENCOUNTER — APPOINTMENT (EMERGENCY)
Dept: RADIOLOGY | Facility: HOSPITAL | Age: 54
End: 2019-04-24
Attending: EMERGENCY MEDICINE
Payer: COMMERCIAL

## 2019-04-24 ENCOUNTER — HOSPITAL ENCOUNTER (EMERGENCY)
Facility: HOSPITAL | Age: 54
Discharge: HOME | End: 2019-04-24
Attending: EMERGENCY MEDICINE
Payer: COMMERCIAL

## 2019-04-24 VITALS
DIASTOLIC BLOOD PRESSURE: 78 MMHG | RESPIRATION RATE: 16 BRPM | TEMPERATURE: 99 F | SYSTOLIC BLOOD PRESSURE: 144 MMHG | HEIGHT: 75 IN | HEART RATE: 71 BPM | OXYGEN SATURATION: 98 % | WEIGHT: 265 LBS | BODY MASS INDEX: 32.95 KG/M2

## 2019-04-24 DIAGNOSIS — I10 HYPERTENSION, UNSPECIFIED TYPE: Primary | ICD-10-CM

## 2019-04-24 LAB
ANION GAP SERPL CALC-SCNC: 11 MEQ/L (ref 3–15)
BASOPHILS # BLD: 0.04 K/UL (ref 0.01–0.1)
BASOPHILS NFR BLD: 0.5 %
BUN SERPL-MCNC: 14 MG/DL (ref 8–20)
CALCIUM SERPL-MCNC: 9.5 MG/DL (ref 8.9–10.3)
CHLORIDE SERPL-SCNC: 104 MEQ/L (ref 98–109)
CO2 SERPL-SCNC: 24 MEQ/L (ref 22–32)
CREAT SERPL-MCNC: 1 MG/DL
DIFFERENTIAL METHOD BLD: NORMAL
EOSINOPHIL # BLD: 0.09 K/UL (ref 0.04–0.54)
EOSINOPHIL NFR BLD: 1.1 %
ERYTHROCYTE [DISTWIDTH] IN BLOOD BY AUTOMATED COUNT: 13.5 % (ref 11.6–14.4)
GFR SERPL CREATININE-BSD FRML MDRD: >60 ML/MIN/1.73M*2
GLUCOSE SERPL-MCNC: 113 MG/DL (ref 70–99)
HCT VFR BLDCO AUTO: 43.1 %
HGB BLD-MCNC: 14.4 G/DL
IMM GRANULOCYTES # BLD AUTO: 0.02 K/UL (ref 0–0.08)
IMM GRANULOCYTES NFR BLD AUTO: 0.3 %
LYMPHOCYTES # BLD: 2.93 K/UL (ref 1.2–3.5)
LYMPHOCYTES NFR BLD: 36.9 %
MCH RBC QN AUTO: 29.5 PG (ref 28–33.2)
MCHC RBC AUTO-ENTMCNC: 33.4 G/DL (ref 32.2–36.5)
MCV RBC AUTO: 88.3 FL (ref 83–98)
MONOCYTES # BLD: 0.76 K/UL (ref 0.3–1)
MONOCYTES NFR BLD: 9.6 %
NEUTROPHILS # BLD: 4.09 K/UL (ref 1.7–7)
NEUTS SEG NFR BLD: 51.6 %
NRBC BLD-RTO: 0 %
PDW BLD AUTO: 9.3 FL (ref 9.4–12.4)
PLATELET # BLD AUTO: 212 K/UL
POTASSIUM SERPL-SCNC: 3.9 MEQ/L (ref 3.6–5.1)
RBC # BLD AUTO: 4.88 M/UL (ref 4.5–5.8)
SODIUM SERPL-SCNC: 139 MEQ/L (ref 136–144)
TROPONIN I SERPL-MCNC: <0.02 NG/ML
WBC # BLD AUTO: 7.93 K/UL

## 2019-04-24 PROCEDURE — 70450 CT HEAD/BRAIN W/O DYE: CPT

## 2019-04-24 PROCEDURE — 71046 X-RAY EXAM CHEST 2 VIEWS: CPT

## 2019-04-24 PROCEDURE — 25800000 HC PHARMACY IV SOLUTIONS: Performed by: PHYSICIAN ASSISTANT

## 2019-04-24 PROCEDURE — 84484 ASSAY OF TROPONIN QUANT: CPT | Performed by: PHYSICIAN ASSISTANT

## 2019-04-24 PROCEDURE — 85025 COMPLETE CBC W/AUTO DIFF WBC: CPT | Performed by: PHYSICIAN ASSISTANT

## 2019-04-24 PROCEDURE — 80048 BASIC METABOLIC PNL TOTAL CA: CPT | Performed by: PHYSICIAN ASSISTANT

## 2019-04-24 PROCEDURE — 36415 COLL VENOUS BLD VENIPUNCTURE: CPT | Performed by: PHYSICIAN ASSISTANT

## 2019-04-24 PROCEDURE — 99284 EMERGENCY DEPT VISIT MOD MDM: CPT | Mod: 25

## 2019-04-24 PROCEDURE — 3E0337Z INTRODUCTION OF ELECTROLYTIC AND WATER BALANCE SUBSTANCE INTO PERIPHERAL VEIN, PERCUTANEOUS APPROACH: ICD-10-PCS | Performed by: EMERGENCY MEDICINE

## 2019-04-24 PROCEDURE — 93005 ELECTROCARDIOGRAM TRACING: CPT | Performed by: PHYSICIAN ASSISTANT

## 2019-04-24 PROCEDURE — 96360 HYDRATION IV INFUSION INIT: CPT

## 2019-04-24 RX ADMIN — SODIUM CHLORIDE 1000 ML: 9 INJECTION, SOLUTION INTRAVENOUS at 22:08

## 2019-04-24 ASSESSMENT — ENCOUNTER SYMPTOMS
PALPITATIONS: 0
SHORTNESS OF BREATH: 0
ABDOMINAL PAIN: 0
WEAKNESS: 0
FATIGUE: 0
NUMBNESS: 0
DIFFICULTY URINATING: 0
NAUSEA: 0
DIZZINESS: 1
HYPERTENSION: 1
SYNCOPE: 0
HEADACHES: 1
LOSS OF CONSCIOUSNESS: 0
VOMITING: 0

## 2019-04-25 LAB
ATRIAL RATE: 77
P AXIS: 28
PR INTERVAL: 170
QRS DURATION: 120
QT INTERVAL: 424
QTC CALCULATION(BAZETT): 479
R AXIS: 10
T WAVE AXIS: 21
VENTRICULAR RATE: 77

## 2019-04-25 NOTE — ED PROVIDER NOTES
"HPI     Chief Complaint   Patient presents with   • Hypertension   • Dizziness       55 y/o M with h/o HTN, HLD, T2DM presents to the ER with concerns of his BP    Pt says that over the weekend he ate a lot of salty foods for easter. 2 days ago he felt like he was very dehydrated and was flying to [place] for a business trip. While on plane started to have a pressure like headache, lightheadedness and felt very \"lousy\" and had some blurred vision when he woke up from a nap that lasted seconds. He says he drank a \"ton of water\" and took his Amlodipine and started to feel better.    Today he reports still feeling \"off\" and not himself. Does not feel lightheaded currently but feels a pressure-like headache. He took his BP and it was 170s/90s so came to the ER for evaluation     No nausea, vomiting, cp, sob, numbness, tingling, vision loss or any other complaints         History provided by:  Patient  Hypertension   Severity:  Moderate  Timing:  Constant  Context: caffeine    Associated symptoms: dizziness and headaches    Associated symptoms: no abdominal pain, no chest pain, no fatigue, no loss of consciousness, no nausea, no palpitations, no shortness of breath, no syncope, not vomiting and no weakness    Dizziness   Associated symptoms: headaches    Associated symptoms: no chest pain, no nausea, no palpitations, no shortness of breath, no syncope, no vomiting and no weakness         Patient History     Past Medical History:   Diagnosis Date   • Folliculitis    • Hyperlipemia    • Hypertension    • Type 2 diabetes mellitus (CMS/HCC) (HCC)     \"borderline\" per pt - diet controlled       Past Surgical History:   Procedure Laterality Date   • ELBOW RECONSTRUCTION     • KNEE SURGERY Bilateral    • VASECTOMY         Family History   Problem Relation Age of Onset   • Hyperlipidemia Mother    • Hypertension Mother        Social History   Substance Use Topics   • Smoking status: Never Smoker   • Smokeless tobacco: Never Used " "  • Alcohol use 4.8 oz/week     8 Glasses of wine per week      Comment: 3-4 drinks - twice a week       Systems Reviewed from Nursing Triage:          Review of Systems     Review of Systems   Constitutional: Negative for fatigue.   Eyes: Negative for visual disturbance.   Respiratory: Negative for shortness of breath.    Cardiovascular: Negative for chest pain, palpitations, leg swelling and syncope.   Gastrointestinal: Negative for abdominal pain, nausea and vomiting.   Genitourinary: Negative for difficulty urinating.   Musculoskeletal: Negative for gait problem.   Neurological: Positive for dizziness and headaches. Negative for loss of consciousness, syncope, weakness and numbness.        Physical Exam     ED Triage Vitals   Temp Heart Rate Resp BP SpO2   04/24/19 2135 04/24/19 2135 04/24/19 2135 04/24/19 2135 04/24/19 2135   37.2 °C (99 °F) 82 20 (!) 178/78 95 %      Temp Source Heart Rate Source Patient Position BP Location FiO2 (%) (Set)   04/24/19 2135 -- 04/24/19 2259 04/24/19 2259 --   Tympanic  Lying Left upper arm                      Patient Vitals for the past 24 hrs:   BP Temp Temp src Pulse Resp SpO2 Height Weight   04/24/19 2259 (!) 168/84 - - 71 16 98 % - -   04/24/19 2135 (!) 178/78 37.2 °C (99 °F) Tympanic 82 20 95 % 1.905 m (6' 3\") 120 kg (265 lb)           Physical Exam   Constitutional: He is oriented to person, place, and time. He appears well-developed. No distress.   Eyes: Pupils are equal, round, and reactive to light. Conjunctivae and EOM are normal.   Cardiovascular: Normal rate, regular rhythm, normal heart sounds and intact distal pulses.    Pulmonary/Chest: Effort normal and breath sounds normal. No respiratory distress. He has no wheezes. He has no rales.   Musculoskeletal: He exhibits no edema.   Neurological: He is alert and oriented to person, place, and time. No cranial nerve deficit or sensory deficit. He exhibits normal muscle tone. Coordination normal.   Skin: Skin is warm " and dry. He is not diaphoretic.            Procedures     ED Course & MDM     Labs Reviewed   BASIC METABOLIC PANEL - Abnormal        Result Value    Sodium 139      Potassium 3.9      Chloride 104      CO2 24      BUN 14      Creatinine 1.0      Glucose 113 (*)     Calcium 9.5      eGFR >60.0      Anion Gap 11     CBC - Abnormal     WBC 7.93      RBC 4.88      Hemoglobin 14.4      Hematocrit 43.1      MCV 88.3      MCH 29.5      MCHC 33.4      RDW 13.5      Platelets 212      MPV 9.3 (*)    TROPONIN I - Normal    Troponin I <0.02     CBC AND DIFFERENTIAL    Narrative:     The following orders were created for panel order CBC and Differential.  Procedure                               Abnormality         Status                     ---------                               -----------         ------                     CBC[[id number]]                           Abnormal            Final result               Diff Count[[id number]]                                        Final result                 Please view results for these tests on the individual orders.   DIFF COUNT    Differential Type Auto      nRBC 0.0      Immature Granulocytes 0.3      Neutrophils 51.6      Lymphocytes 36.9      Monocytes 9.6      Eosinophils 1.1      Basophils 0.5      Immature Granulocytes, Absolute 0.02      Neutrophils, Absolute 4.09      Lymphocytes, Absolute 2.93      Monocytes, Absolute 0.76      Eosinophils, Absolute 0.09      Basophils, Absolute 0.04         X-RAY CHEST 2 VIEWS   ED Interpretation   NAD      CT HEAD WITHOUT IV CONTRAST   Final Result   IMPRESSION: No evidence of hemorrhage, mass effect or large acute territorial   infarction by CT criteria.      I certify that I have reviewed this examination and agree with this report.   Petty Jack MD            ECG 12 lead                     [facility]         ED Course as of Apr 24 2315 Wed Apr 24, 2019   2314 Pt /84.  He sees Dr May for his BP  Instructed him to follow up with  him to discuss if he needs any med changes  [AC]      ED Course User Index  [AC] Doris Aparicio PA C         Clinical Impressions as of Apr 24 2315   Hypertension, unspecified type        Doris Aparicio PA C  04/24/19 2315

## 2019-04-25 NOTE — DISCHARGE INSTRUCTIONS
Follow up with your family doctor. Call tomorrow to schedule appointment. You may need to have blood pressure medications adjusted  Return to ER if any severe headache, nausea, vomiting, fainting, vision loss, chest pain or trouble breathing

## 2019-04-25 NOTE — ED ATTESTATION NOTE
I have personally seen and examined the patient.  I reviewed and agree with physician assistant / nurse practitioner’s assessment and plan of care, with the following exceptions: None  My examination, assessment, and plan of care of Usman Gottlieb is as follows:     Patient is a 54-year-old male with a past medical history of hypertension, hyperlipidemia and type 2 diabetes who presents to the emergency department due to concern for high blood pressure.  Patient feels generally unwell.  States that over the weekend, he had a lot of salty foods.  He had a business trip to [place] earlier this week where he states that he did not drink enough water and he is not sure if he missed a dose of his amlodipine.    Exam, patient is awake, alert, oriented.  Clinically, he is very well-appearing.  Patient is hypertensive, but vitals are otherwise reassuring.  Respirations are nonlabored, heart rate is regular.    Work-up was obtained as noted in the chart..  No symptoms concerning for CVA, bleed, ACS, or hypertensive emergency.  Patient is stable for discharge and follow-up with his PCP     Yolis Gimenez MD  04/24/19 2024

## 2019-06-27 ENCOUNTER — TRANSCRIBE ORDERS (OUTPATIENT)
Dept: SCHEDULING | Age: 54
End: 2019-06-27

## 2019-06-27 DIAGNOSIS — E06.3 AUTOIMMUNE THYROIDITIS: Primary | ICD-10-CM

## 2020-08-06 ENCOUNTER — HOSPITAL ENCOUNTER (EMERGENCY)
Facility: HOSPITAL | Age: 55
Discharge: HOME | End: 2020-08-06
Attending: EMERGENCY MEDICINE
Payer: COMMERCIAL

## 2020-08-06 VITALS
TEMPERATURE: 97.9 F | HEART RATE: 58 BPM | BODY MASS INDEX: 32.33 KG/M2 | SYSTOLIC BLOOD PRESSURE: 166 MMHG | HEIGHT: 75 IN | RESPIRATION RATE: 14 BRPM | DIASTOLIC BLOOD PRESSURE: 73 MMHG | WEIGHT: 260 LBS | OXYGEN SATURATION: 100 %

## 2020-08-06 DIAGNOSIS — L23.7 POISON IVY: Primary | ICD-10-CM

## 2020-08-06 PROCEDURE — 99281 EMR DPT VST MAYX REQ PHY/QHP: CPT

## 2020-08-06 PROCEDURE — 63700000 HC SELF-ADMINISTRABLE DRUG: Performed by: PHYSICIAN ASSISTANT

## 2020-08-06 RX ORDER — PREDNISONE 10 MG/1
10 TABLET ORAL SEE ADMIN INSTRUCTIONS
Qty: 39 TABLET | Refills: 0 | Status: SHIPPED | OUTPATIENT
Start: 2020-08-06 | End: 2021-03-10

## 2020-08-06 RX ORDER — HYDROCORTISONE 1 %
CREAM (GRAM) TOPICAL ONCE
Status: COMPLETED | OUTPATIENT
Start: 2020-08-06 | End: 2020-08-06

## 2020-08-06 RX ORDER — PREDNISONE 20 MG/1
60 TABLET ORAL ONCE
Status: DISCONTINUED | OUTPATIENT
Start: 2020-08-06 | End: 2020-08-06

## 2020-08-06 RX ORDER — HYDROCORTISONE 1 %
CREAM (GRAM) TOPICAL
Qty: 15 G | Refills: 0 | Status: SHIPPED | OUTPATIENT
Start: 2020-08-06 | End: 2021-03-10

## 2020-08-06 RX ADMIN — HYDROCORTISONE: 1 CREAM TOPICAL at 23:45

## 2020-08-06 ASSESSMENT — ENCOUNTER SYMPTOMS
FEVER: 0
CHILLS: 0
JOINT SWELLING: 0
NUMBNESS: 0
WEAKNESS: 0

## 2020-08-07 NOTE — ED ATTESTATION NOTE
I have personally seen and examined the patient.  I reviewed and agree with physician assistant / nurse practitioner’s assessment and plan of care    My examination, assessment, and plan of care of  is as follows:     Patient was gardening few days ago he now has a rash that is weeping looks like a contact dermatitis on the right lateral ankle that is the only spot where he had a rash.  No other symptoms    Exam  Patient is awake alert oriented no acute distress  Right lateral ankle has about a 3 to 4 cm area of what appears to be poison ivy or contact dermatitis that is weeping there are some vesicles does not look at all like shingles    Plan  We will give steroids here encouraged him to keep it clean and dry to use calamine lotion and hydrocortisone cream     Anuel White MD  08/06/20 0405

## 2020-08-07 NOTE — ED PROVIDER NOTES
"HPI     Chief Complaint   Patient presents with   • Insect Bite     pt reports working in garden yesterday and today, noticed Rt outer ankle itchy bump, thought possible bug bite, after felt more crusty and \"bubble like\"  appears sim to poision ivy         History provided by:  Patient  Rash   Location: right ankle.  Quality: blistering, itchiness and weeping    Severity:  Mild  Duration:  24 hours  Timing:  Constant  Context: plant contact (pt was gardening yesterday)    Ineffective treatments:  None tried  Associated symptoms: no fever    rash is not painful.       Patient History     Past Medical History:   Diagnosis Date   • Folliculitis    • Hyperlipemia    • Hypertension    • Type 2 diabetes mellitus (CMS/HCC)     \"borderline\" per pt - diet controlled       Past Surgical History:   Procedure Laterality Date   • ELBOW RECONSTRUCTION     • KNEE SURGERY Bilateral    • VASECTOMY         Family History   Problem Relation Age of Onset   • Hyperlipidemia Biological Mother    • Hypertension Biological Mother        Social History     Tobacco Use   • Smoking status: Never Smoker   • Smokeless tobacco: Never Used   Substance Use Topics   • Alcohol use: Yes     Alcohol/week: 8.0 standard drinks     Types: 8 Glasses of wine per week     Comment: 3-4 drinks - twice a week   • Drug use: No       Systems Reviewed from Nursing Triage:          Review of Systems     Review of Systems   Constitutional: Negative for chills and fever.   Musculoskeletal: Negative for joint swelling.   Skin: Positive for rash.   Allergic/Immunologic: Negative for immunocompromised state.   Neurological: Negative for weakness and numbness.        Physical Exam     ED Triage Vitals [08/06/20 2222]   Temp Heart Rate Resp BP SpO2   36.6 °C (97.9 °F) (!) 58 14 (!) 166/73 100 %      Temp Source Heart Rate Source Patient Position BP Location FiO2 (%) (Set)   Tympanic -- -- -- --                     Patient Vitals for the past 24 hrs:   BP Temp Temp src " "Pulse Resp SpO2 Height Weight   08/06/20 2222 (!) 166/73 36.6 °C (97.9 °F) Tympanic (!) 58 14 100 % -- --   08/06/20 2220 -- -- -- -- -- -- 1.905 m (6' 3\") 118 kg (260 lb)                                          Physical Exam   Constitutional: He appears well-developed and well-nourished. No distress.   Cardiovascular:   Pulses:       Dorsalis pedis pulses are 2+ on the right side.        Posterior tibial pulses are 2+ on the right side.   Pulmonary/Chest: No respiratory distress.   Musculoskeletal:   FAROM right ankle without pain, no swelling   Neurological: He is alert.   Skin: Skin is warm and dry.   ~3cm area of weeping vesicles c/w poison ivy localized to right lateral malleolus, NTTP, no other rash   Nursing note and vitals reviewed.           Procedures    Labs Reviewed - No data to display    No orders to display               ED Course & MDM     MDM  Number of Diagnoses or Management Options  Poison ivy:            ED Course as of Aug 07 0310   Thu Aug 06, 2020   2310 Rash c/w localized poison ivy  Will start pt on topical hydrocortisone x 10 days  FU with PCP for re-evaluation advised    [TC]      ED Course User Index  [TC] Sheri Ahmadi PA C         Clinical Impressions as of Aug 07 0310   Poison ivy     Disposition  Discharged      Sheri Ahmadi PA C  08/07/20 0316    "

## 2021-03-10 ENCOUNTER — APPOINTMENT (EMERGENCY)
Dept: RADIOLOGY | Facility: HOSPITAL | Age: 56
End: 2021-03-10
Attending: EMERGENCY MEDICINE
Payer: COMMERCIAL

## 2021-03-10 ENCOUNTER — HOSPITAL ENCOUNTER (EMERGENCY)
Facility: HOSPITAL | Age: 56
Discharge: HOME | End: 2021-03-10
Attending: EMERGENCY MEDICINE
Payer: COMMERCIAL

## 2021-03-10 VITALS
HEIGHT: 75 IN | TEMPERATURE: 98.6 F | BODY MASS INDEX: 32.95 KG/M2 | SYSTOLIC BLOOD PRESSURE: 145 MMHG | OXYGEN SATURATION: 97 % | RESPIRATION RATE: 13 BRPM | WEIGHT: 265 LBS | HEART RATE: 76 BPM | DIASTOLIC BLOOD PRESSURE: 63 MMHG

## 2021-03-10 DIAGNOSIS — R07.89 FEELING OF CHEST TIGHTNESS: Primary | ICD-10-CM

## 2021-03-10 LAB
ANION GAP SERPL CALC-SCNC: 10 MEQ/L (ref 3–15)
BASOPHILS # BLD: 0.03 K/UL (ref 0.01–0.1)
BASOPHILS NFR BLD: 0.4 %
BUN SERPL-MCNC: 17 MG/DL (ref 8–20)
CALCIUM SERPL-MCNC: 9.6 MG/DL (ref 8.9–10.3)
CHLORIDE SERPL-SCNC: 106 MEQ/L (ref 98–109)
CO2 SERPL-SCNC: 23 MEQ/L (ref 22–32)
CREAT SERPL-MCNC: 0.8 MG/DL (ref 0.8–1.3)
DIFFERENTIAL METHOD BLD: ABNORMAL
EOSINOPHIL # BLD: 0.03 K/UL (ref 0.04–0.54)
EOSINOPHIL NFR BLD: 0.4 %
ERYTHROCYTE [DISTWIDTH] IN BLOOD BY AUTOMATED COUNT: 14 % (ref 11.6–14.4)
GFR SERPL CREATININE-BSD FRML MDRD: >60 ML/MIN/1.73M*2
GLUCOSE BLD-MCNC: 100 MG/DL (ref 70–99)
GLUCOSE SERPL-MCNC: 118 MG/DL (ref 70–99)
HCT VFR BLDCO AUTO: 44.2 % (ref 40.1–51)
HGB BLD-MCNC: 14.4 G/DL (ref 13.7–17.5)
IMM GRANULOCYTES # BLD AUTO: 0.04 K/UL (ref 0–0.08)
IMM GRANULOCYTES NFR BLD AUTO: 0.5 %
LYMPHOCYTES # BLD: 2.01 K/UL (ref 1.2–3.5)
LYMPHOCYTES NFR BLD: 26.6 %
MCH RBC QN AUTO: 29.4 PG (ref 28–33.2)
MCHC RBC AUTO-ENTMCNC: 32.6 G/DL (ref 32.2–36.5)
MCV RBC AUTO: 90.4 FL (ref 83–98)
MONOCYTES # BLD: 0.51 K/UL (ref 0.3–1)
MONOCYTES NFR BLD: 6.7 %
NEUTROPHILS # BLD: 4.94 K/UL (ref 1.7–7)
NEUTS SEG NFR BLD: 65.4 %
NRBC BLD-RTO: 0 %
PDW BLD AUTO: 9.7 FL (ref 9.4–12.4)
PLATELET # BLD AUTO: 215 K/UL (ref 150–350)
POCT TEST: ABNORMAL
POTASSIUM SERPL-SCNC: 4 MEQ/L (ref 3.6–5.1)
RBC # BLD AUTO: 4.89 M/UL (ref 4.5–5.8)
SODIUM SERPL-SCNC: 139 MEQ/L (ref 136–144)
TROPONIN I SERPL-MCNC: <0.02 NG/ML
WBC # BLD AUTO: 7.56 K/UL (ref 3.8–10.5)

## 2021-03-10 PROCEDURE — 71046 X-RAY EXAM CHEST 2 VIEWS: CPT

## 2021-03-10 PROCEDURE — 99283 EMERGENCY DEPT VISIT LOW MDM: CPT | Mod: 25

## 2021-03-10 PROCEDURE — 93005 ELECTROCARDIOGRAM TRACING: CPT | Performed by: PHYSICIAN ASSISTANT

## 2021-03-10 PROCEDURE — 36415 COLL VENOUS BLD VENIPUNCTURE: CPT | Performed by: EMERGENCY MEDICINE

## 2021-03-10 PROCEDURE — 80048 BASIC METABOLIC PNL TOTAL CA: CPT | Performed by: PHYSICIAN ASSISTANT

## 2021-03-10 PROCEDURE — 84484 ASSAY OF TROPONIN QUANT: CPT | Performed by: PHYSICIAN ASSISTANT

## 2021-03-10 PROCEDURE — 85025 COMPLETE CBC W/AUTO DIFF WBC: CPT | Performed by: PHYSICIAN ASSISTANT

## 2021-03-10 ASSESSMENT — ENCOUNTER SYMPTOMS
NUMBNESS: 0
LIGHT-HEADEDNESS: 0
ABDOMINAL PAIN: 0
WEAKNESS: 0
VOMITING: 0
NAUSEA: 0
CHEST TIGHTNESS: 1
COUGH: 0
FEVER: 0
CHILLS: 1
SHORTNESS OF BREATH: 0
DIARRHEA: 1

## 2021-03-10 NOTE — ED PROVIDER NOTES
"Emergency Medicine Note  HPI   HISTORY OF PRESENT ILLNESS     56-year-old male past medical history hyperlipidemia, hypertension, paroxysmal A. fib known to Dr. May from cardiology presents with chest tightness, chills, diarrhea.  Patient reports that approximately 10:00 AM while sitting in his home office he developed left-sided chest tightness.  The sensation was intermittent and would come and go.  He currently does not have the sensation.  This was followed by a sensation of chills as well as 2 episodes of diarrhea.  There is no blood noted in his diarrhea.  He has no abdominal pain.  No recent antibiotic use.  He checked his blood pressure at home and noticed it was in the 160 systolic and thus presented to the emergency department for evaluation where he is asymptomatic aside from feeling \"cold.\"            Patient History   PAST HISTORY     Reviewed from Nursing Triage:      Past Medical History:   Diagnosis Date   • A-fib (CMS/[facility])    • Folliculitis    • Hyperlipemia    • Hypertension        Past Surgical History:   Procedure Laterality Date   • ELBOW RECONSTRUCTION     • KNEE SURGERY Bilateral    • VASECTOMY         Family History   Problem Relation Age of Onset   • Hyperlipidemia Biological Mother    • Hypertension Biological Mother        Social History     Tobacco Use   • Smoking status: Never Smoker   • Smokeless tobacco: Never Used   Substance Use Topics   • Alcohol use: Yes     Alcohol/week: 8.0 standard drinks     Types: 8 Glasses of wine per week     Comment: 3-4 drinks - twice a week   • Drug use: No         Review of Systems   REVIEW OF SYSTEMS     Review of Systems   Constitutional: Positive for chills. Negative for fever.   Respiratory: Positive for chest tightness. Negative for cough and shortness of breath.    Gastrointestinal: Positive for diarrhea. Negative for abdominal pain, nausea and vomiting.   Neurological: Negative for weakness, light-headedness and numbness.         VITALS     ED " Vitals    Date/Time Temp Pulse Resp BP SpO2 [facility]   03/10/21 1241 -- 78 -- -- -- [facility]   03/10/21 1228 -- 79 12 173/79 98 % [facility]   03/10/21 1128 37 °C (98.6 °F) 77 18 175/81 100 % [facility]        Pulse Ox %: 100 % (03/10/21 1152)  Pulse Ox Interpretation: Normal (03/10/21 1152)  Heart Rate: 77 (03/10/21 1152)  Rhythm Strip Interpretation: Normal Sinus Rhythm (03/10/21 1152)     Physical Exam   PHYSICAL EXAM     Physical Exam  Vitals signs and nursing note reviewed.   Constitutional:       Appearance: Normal appearance. He is not ill-appearing or toxic-appearing.   Cardiovascular:      Rate and Rhythm: Normal rate and regular rhythm.      Pulses: Normal pulses.      Heart sounds: No murmur.   Pulmonary:      Effort: Pulmonary effort is normal.      Breath sounds: No wheezing or rhonchi.   Abdominal:      General: Abdomen is flat.      Tenderness: There is no abdominal tenderness. There is no guarding or rebound.   Musculoskeletal:         General: No swelling.      Right lower leg: No edema.      Left lower leg: No edema.   Skin:     Capillary Refill: Capillary refill takes less than 2 seconds.   Neurological:      Mental Status: He is alert.           PROCEDURES     Procedures     DATA     Results     Procedure Component Value Units Date/Time    Troponin I [[id number]]  (Normal) Collected: 03/10/21 1144    Specimen: Blood, Venous Updated: 03/10/21 1221     Troponin I <0.02 ng/mL     Basic metabolic panel [[id number]]  (Abnormal) Collected: 03/10/21 1144    Specimen: Blood, Venous Updated: 03/10/21 1220     Sodium 139 mEQ/L      Potassium 4.0 mEQ/L      Comment: Results obtained on plasma. Plasma Potassium values may be up to 0.4 mEQ/L less than serum values. The differences may be greater for patients with high platelet or white cell counts.        Chloride 106 mEQ/L      CO2 23 mEQ/L      BUN 17 mg/dL      Creatinine 0.8 mg/dL      Glucose 118 mg/dL      Calcium 9.6 mg/dL      eGFR >60.0 mL/min/1.73m*2      Anion Gap 10 mEQ/L      CBC and differential [[id number]]  (Abnormal) Collected: 03/10/21 1144    Specimen: Blood, Venous Updated: 03/10/21 1154     WBC 7.56 K/uL      RBC 4.89 M/uL      Hemoglobin 14.4 g/dL      Hematocrit 44.2 %      MCV 90.4 fL      MCH 29.4 pg      MCHC 32.6 g/dL      RDW 14.0 %      Platelets 215 K/uL      MPV 9.7 fL      Differential Type Auto     nRBC 0.0 %      Immature Granulocytes 0.5 %      Neutrophils 65.4 %      Lymphocytes 26.6 %      Monocytes 6.7 %      Eosinophils 0.4 %      Basophils 0.4 %      Immature Granulocytes, Absolute 0.04 K/uL      Neutrophils, Absolute 4.94 K/uL      Lymphocytes, Absolute 2.01 K/uL      Monocytes, Absolute 0.51 K/uL      Eosinophils, Absolute 0.03 K/uL      Basophils, Absolute 0.03 K/uL     RAINBOW LT GREEN [[id number]] Collected: 03/10/21 1144    Specimen: Blood, Venous Updated: 03/10/21 1150    RAINBOW LAVENDER [[id number]] Collected: 03/10/21 1144    Specimen: Blood, Venous Updated: 03/10/21 1150    RAINBOW LT GREEN [[id number]] Collected: 03/10/21 1144    Specimen: Blood, Venous Updated: 03/10/21 1150    RAINBOW LAVENDER [[id number]] Collected: 03/10/21 1144    Specimen: Blood, Venous Updated: 03/10/21 1150    [place] Draw Panel [[id number]] Collected: 03/10/21 1144    Specimen: Blood, Venous Updated: 03/10/21 1150    Narrative:      The following orders were created for panel order [place] Draw Panel.  Procedure                               Abnormality         Status                     ---------                               -----------         ------                     RAINBOW LAVENDER[[id number]]                                  In process                 RAINBOW LAVENDER[[id number]]                                 In process                 RAINBOW LT BLUE[[id number]]                                  In process                 RAINBOW LT GREEN[[id number]]                                 In process                 RAINBOW LT GREEN[[id number]]                                 In  process                   Please view results for these tests on the individual orders.    RAINBOW LT ABEBE [[id number]] Collected: 03/10/21 1144    Specimen: Blood, Venous Updated: 03/10/21 1150          Imaging Results          X-RAY CHEST 2 VIEWS (Final result)  Result time 03/10/21 12:20:10    Final result                 Impression:    IMPRESSION:  No active disease is seen in the chest.             Narrative:    CLINICAL HISTORY: Chest tightness    COMMENT:    PA and lateral views of the chest show clear lung fields.  The  heart, pulmonary vasculature and mediastinum are within normal limits.  Comparison is made to previous study dated 4/24/2019.                                ECG 12 lead   ED Interpretation   EKG 1142 limited by artifact  87 bpm nsr  No stemi  Normal axis  Jolene 162  Qt/qtc 388/466            ECG 12 lead    (Results Pending)       Scoring tools                               ED Course & MDM   MDM / ED COURSE and CLINICAL IMPRESSIONS     [facility]    ED Course as of Mar 10 1245   Wed Mar 10, 2021   1136 Patient ambulated to room unassisted     [SK]   1216 HEART score 2 if initial troponin is negative    [SK]   1222 Narrative & Impression    CLINICAL HISTORY: Chest tightness     COMMENT:    PA and lateral views of the chest show clear lung fields.  The  heart, pulmonary vasculature and mediastinum are within normal limits.  Comparison is made to previous study dated 4/24/2019.     --  IMPRESSION:  No active disease is seen in the chest.       X-RAY CHEST 2 VIEWS [SK]   1240 Repeat EKG 1225 seen by Dr. Espino  78 bpm nsr normal axis no stemi    Patient remains symptom free and feels well. BP 140s/ 60s in the room. He will fu with cardiology and next day cards form was faxed for patient.     [SK]      ED Course User Index  [SK] Trinh Gore PA C         Clinical Impressions as of Mar 10 1245   Feeling of chest tightness            Trinh Gore PA C  03/10/21 1245

## 2021-03-10 NOTE — ED ATTESTATION NOTE
I have personally seen and examined the patient.  I reviewed and agree with physician assistant / nurse practitioner’s assessment and plan of care, with the following exceptions: None  My examination, assessment, and plan of care of Usman Gottlieb is as follows:     PT was sitting at his desk, and about 9am, pt co chest tightness.  He had about 4 to 5 episode, each lasting around 20 to 30 seconds.  Afterwards, he had chills, belching and then diarrhea. After that, he hasn't had any chest tightness.  When he had it, there was no sob or radiation.  Exam: awake, alert. Heart reg, lungs clear, abd soft, nt.  No calf tenderness BL     Sterious, Tyson MCFARLAND MD  03/10/21 120

## 2021-03-10 NOTE — DISCHARGE INSTRUCTIONS
A next day cardiology appointment form has been faxed to you. If you do not hear from the below listed cardiologist office by noon tomorrow, please call number provided and inform them that you were seen in the ER with the recommendation to follow up in 1 day.     Follow up with primary care in 2-3 days for repeat evaluation.  Call when discharged or first thing in the morning for appointment to be seen.     Return if symptoms change or worsen, or for any other acute concerns.

## 2021-03-11 LAB
ATRIAL RATE: 78
ATRIAL RATE: 87
P AXIS: 36
P AXIS: 53
PR INTERVAL: 162
PR INTERVAL: 168
QRS DURATION: 104
QRS DURATION: 110
QT INTERVAL: 388
QT INTERVAL: 410
QTC CALCULATION(BAZETT): 466
QTC CALCULATION(BAZETT): 467
R AXIS: 22
R AXIS: 9
T WAVE AXIS: 21
T WAVE AXIS: 38
VENTRICULAR RATE: 78
VENTRICULAR RATE: 87

## 2021-03-15 ENCOUNTER — TRANSCRIBE ORDERS (OUTPATIENT)
Dept: SCHEDULING | Age: 56
End: 2021-03-15

## 2021-03-15 DIAGNOSIS — I20.9 ANGINA PECTORIS, UNSPECIFIED: Primary | ICD-10-CM

## 2021-04-07 ENCOUNTER — HOSPITAL ENCOUNTER (OUTPATIENT)
Dept: RADIOLOGY | Facility: CLINIC | Age: 56
Discharge: HOME | End: 2021-04-07
Attending: INTERNAL MEDICINE
Payer: COMMERCIAL

## 2021-04-07 VITALS
WEIGHT: 265 LBS | HEIGHT: 75 IN | BODY MASS INDEX: 32.95 KG/M2 | HEART RATE: 60 BPM | DIASTOLIC BLOOD PRESSURE: 70 MMHG | SYSTOLIC BLOOD PRESSURE: 124 MMHG

## 2021-04-07 DIAGNOSIS — I20.9 ANGINA PECTORIS, UNSPECIFIED: ICD-10-CM

## 2021-04-07 PROCEDURE — 63600105 HC IODINE BASED CONTRAST: Performed by: INTERNAL MEDICINE

## 2021-04-07 PROCEDURE — 75574 CT ANGIO HRT W/3D IMAGE: CPT

## 2021-04-07 RX ORDER — NITROGLYCERIN 0.3 MG/1
0.3 TABLET SUBLINGUAL
Status: DISCONTINUED | OUTPATIENT
Start: 2021-04-07 | End: 2021-04-08 | Stop reason: HOSPADM

## 2021-04-07 RX ADMIN — IOHEXOL 100 ML: 350 INJECTION, SOLUTION INTRAVENOUS at 09:15

## 2021-04-07 NOTE — PROGRESS NOTES
C/O chest tightness, HTN, HLD, hx of paroxysmal atrial fibrilliation    50 mg of Metoprolol po, and Nitro 0.4 mg SL    # 18 in RAC    D/C instructions understood    90 cc prospective.

## 2021-04-14 DIAGNOSIS — Z23 ENCOUNTER FOR IMMUNIZATION: ICD-10-CM

## 2021-10-29 ENCOUNTER — IMMUNIZATION (OUTPATIENT)
Dept: IMMUNIZATION | Facility: CLINIC | Age: 56
End: 2021-10-29
Payer: COMMERCIAL

## 2021-10-29 PROCEDURE — 0004A COVID-19 (SARS-COV-2) VACCINE, PFIZER: CPT | Performed by: FAMILY MEDICINE

## 2021-10-29 PROCEDURE — 91300 COVID-19 (SARS-COV-2) VACCINE, PFIZER: CPT | Performed by: FAMILY MEDICINE

## 2024-12-16 ENCOUNTER — HOSPITAL ENCOUNTER (EMERGENCY)
Facility: HOSPITAL | Age: 59
Discharge: HOME | End: 2024-12-16
Attending: STUDENT IN AN ORGANIZED HEALTH CARE EDUCATION/TRAINING PROGRAM
Payer: COMMERCIAL

## 2024-12-16 ENCOUNTER — TRANSCRIBE ORDERS (OUTPATIENT)
Dept: CARDIOLOGY | Facility: HOSPITAL | Age: 59
End: 2024-12-16

## 2024-12-16 ENCOUNTER — APPOINTMENT (EMERGENCY)
Dept: RADIOLOGY | Facility: HOSPITAL | Age: 59
End: 2024-12-16
Payer: COMMERCIAL

## 2024-12-16 ENCOUNTER — APPOINTMENT (EMERGENCY)
Dept: LAB | Facility: HOSPITAL | Age: 59
End: 2024-12-16
Attending: INTERNAL MEDICINE
Payer: COMMERCIAL

## 2024-12-16 VITALS
RESPIRATION RATE: 18 BRPM | HEART RATE: 78 BPM | DIASTOLIC BLOOD PRESSURE: 72 MMHG | OXYGEN SATURATION: 98 % | SYSTOLIC BLOOD PRESSURE: 144 MMHG | TEMPERATURE: 97.8 F | WEIGHT: 265 LBS | HEIGHT: 75 IN | BODY MASS INDEX: 32.95 KG/M2

## 2024-12-16 DIAGNOSIS — I48.91 ATRIAL FIBRILLATION, UNSPECIFIED TYPE (CMS/HCC): Primary | ICD-10-CM

## 2024-12-16 DIAGNOSIS — E78.5 HYPERLIPIDEMIA, UNSPECIFIED: Primary | ICD-10-CM

## 2024-12-16 DIAGNOSIS — E78.5 HYPERLIPIDEMIA, UNSPECIFIED: ICD-10-CM

## 2024-12-16 LAB
ALBUMIN SERPL-MCNC: 4.7 G/DL (ref 3.5–5.7)
ALBUMIN SERPL-MCNC: 5 G/DL (ref 3.5–5.7)
ALP SERPL-CCNC: 46 IU/L (ref 34–125)
ALP SERPL-CCNC: 46 IU/L (ref 34–125)
ALT SERPL-CCNC: 29 IU/L (ref 7–52)
ALT SERPL-CCNC: 31 IU/L (ref 7–52)
ANION GAP SERPL CALC-SCNC: 7 MEQ/L (ref 3–15)
ANION GAP SERPL CALC-SCNC: 9 MEQ/L (ref 3–15)
AST SERPL-CCNC: 21 IU/L (ref 13–39)
AST SERPL-CCNC: 24 IU/L (ref 13–39)
BASOPHILS # BLD: 0.05 K/UL (ref 0.01–0.1)
BASOPHILS NFR BLD: 0.5 %
BILIRUB SERPL-MCNC: 0.3 MG/DL (ref 0.3–1.2)
BILIRUB SERPL-MCNC: 0.5 MG/DL (ref 0.3–1.2)
BUN SERPL-MCNC: 16 MG/DL (ref 7–25)
BUN SERPL-MCNC: 21 MG/DL (ref 7–25)
CALCIUM SERPL-MCNC: 9.5 MG/DL (ref 8.6–10.3)
CALCIUM SERPL-MCNC: 9.8 MG/DL (ref 8.6–10.3)
CHLORIDE SERPL-SCNC: 102 MEQ/L (ref 98–107)
CHLORIDE SERPL-SCNC: 102 MEQ/L (ref 98–107)
CHOLEST SERPL-MCNC: 142 MG/DL
CO2 SERPL-SCNC: 30 MEQ/L (ref 21–31)
CO2 SERPL-SCNC: 30 MEQ/L (ref 21–31)
CREAT SERPL-MCNC: 0.8 MG/DL (ref 0.7–1.3)
CREAT SERPL-MCNC: 0.9 MG/DL (ref 0.7–1.3)
DIFFERENTIAL METHOD BLD: ABNORMAL
EGFRCR SERPLBLD CKD-EPI 2021: >60 ML/MIN/1.73M*2
EGFRCR SERPLBLD CKD-EPI 2021: >60 ML/MIN/1.73M*2
EOSINOPHIL # BLD: 0.15 K/UL (ref 0.04–0.54)
EOSINOPHIL NFR BLD: 1.5 %
ERYTHROCYTE [DISTWIDTH] IN BLOOD BY AUTOMATED COUNT: 13.7 % (ref 11.6–14.4)
GLUCOSE SERPL-MCNC: 104 MG/DL (ref 70–99)
GLUCOSE SERPL-MCNC: 117 MG/DL (ref 70–99)
HCT VFR BLD AUTO: 43.1 % (ref 40.1–51)
HDLC SERPL-MCNC: 59 MG/DL
HDLC SERPL: 2.4 {RATIO}
HGB BLD-MCNC: 13.9 G/DL (ref 13.7–17.5)
IMM GRANULOCYTES # BLD AUTO: 0.02 K/UL (ref 0–0.08)
IMM GRANULOCYTES NFR BLD AUTO: 0.2 %
LDLC SERPL CALC-MCNC: 65 MG/DL
LYMPHOCYTES # BLD: 4.27 K/UL (ref 1.2–3.5)
LYMPHOCYTES NFR BLD: 41.6 %
MAGNESIUM SERPL-MCNC: 2 MG/DL (ref 1.8–2.5)
MCH RBC QN AUTO: 29.3 PG (ref 28–33.2)
MCHC RBC AUTO-ENTMCNC: 32.3 G/DL (ref 32.2–36.5)
MCV RBC AUTO: 90.9 FL (ref 83–98)
MONOCYTES # BLD: 0.94 K/UL (ref 0.3–1)
MONOCYTES NFR BLD: 9.2 %
NEUTROPHILS # BLD: 4.83 K/UL (ref 1.7–7)
NEUTS SEG NFR BLD: 47 %
NONHDLC SERPL-MCNC: 83 MG/DL
NRBC BLD-RTO: 0 %
PLATELET # BLD AUTO: 246 K/UL (ref 150–350)
PMV BLD AUTO: 9.4 FL (ref 9.4–12.4)
POTASSIUM SERPL-SCNC: 3.4 MEQ/L (ref 3.5–5.1)
POTASSIUM SERPL-SCNC: 4 MEQ/L (ref 3.5–5.1)
PROT SERPL-MCNC: 7.8 G/DL (ref 6–8.2)
PROT SERPL-MCNC: 8.3 G/DL (ref 6–8.2)
RBC # BLD AUTO: 4.74 M/UL (ref 4.5–5.8)
SODIUM SERPL-SCNC: 139 MEQ/L (ref 136–145)
SODIUM SERPL-SCNC: 141 MEQ/L (ref 136–145)
TRIGL SERPL-MCNC: 89 MG/DL
TROPONIN I SERPL HS-MCNC: 4.5 PG/ML
TROPONIN I SERPL HS-MCNC: 5.6 PG/ML
TSH SERPL DL<=0.05 MIU/L-ACNC: 2.78 MIU/L (ref 0.34–5.6)
WBC # BLD AUTO: 10.26 K/UL (ref 3.8–10.5)

## 2024-12-16 PROCEDURE — 71045 X-RAY EXAM CHEST 1 VIEW: CPT

## 2024-12-16 PROCEDURE — 80061 LIPID PANEL: CPT

## 2024-12-16 PROCEDURE — 85025 COMPLETE CBC W/AUTO DIFF WBC: CPT

## 2024-12-16 PROCEDURE — 83735 ASSAY OF MAGNESIUM: CPT

## 2024-12-16 PROCEDURE — 93005 ELECTROCARDIOGRAM TRACING: CPT | Performed by: STUDENT IN AN ORGANIZED HEALTH CARE EDUCATION/TRAINING PROGRAM

## 2024-12-16 PROCEDURE — 84484 ASSAY OF TROPONIN QUANT: CPT | Mod: 91

## 2024-12-16 PROCEDURE — 99283 EMERGENCY DEPT VISIT LOW MDM: CPT

## 2024-12-16 PROCEDURE — 84443 ASSAY THYROID STIM HORMONE: CPT

## 2024-12-16 PROCEDURE — 93005 ELECTROCARDIOGRAM TRACING: CPT

## 2024-12-16 PROCEDURE — 84484 ASSAY OF TROPONIN QUANT: CPT

## 2024-12-16 PROCEDURE — 36415 COLL VENOUS BLD VENIPUNCTURE: CPT

## 2024-12-16 PROCEDURE — 80053 COMPREHEN METABOLIC PANEL: CPT

## 2024-12-16 NOTE — ED ATTESTATION NOTE
I have personally seen and examined Usman Gottlieb.  I was involved in the care and medical decision making for this patient.    I reviewed and agree with physician assistant / nurse practitioner’s assessment and plan of care; any exceptions are documented below.      My focused history, examination, assessment and plan of care of Usman Gottlieb is as follows:    Brief History:  HPI  59-year-old male presents emergency department for evaluation of palpitations.  Patient states he has a history of A-fib back in 2018.  Was getting go for cardioversion but then spontaneously converted.  He has had a prescription of Flexeril since that time.  Patient states he started this morning with palpitations that woke him from sleep.  He took one of his Flexeril tablets at 2:27 AM.  He waited 30 minutes and then decided to come to the emergency department for evaluation.  Patient denies any chest pain or trouble breathing.  No dizziness or lightheadedness.  No nausea.      Focused Physical Exam:  Physical Exam    On physical examination, patient is well-appearing here.  He is tachycardic with an irregular pulse.  Blood pressure stable.  No respiratory distress, lungs clear to auscultation.  Abdomen soft and nondistended.    Assessment / Plan:        Medical Decision Making  Patient presents emergency department due to atrial fibrillation.  Patient has a history of atrial fibrillation.  Does follow with cardiology.  Patient states that he woke up the palpitations early this morning and took his flecainide.  Patient converted into normal sinus rhythm shortly after arrival.  Patient is well-appearing here.  Laboratory results are reassuring.  Patient is remained in normal sinus rhythm.  He is stable for discharge to follow-up with his cardiologist.    Amount and/or Complexity of Data Reviewed  Labs: ordered.  Radiology: ordered and independent interpretation performed.  ECG/medicine tests: ordered and independent interpretation  performed.        I was physically present for the key/critical portions of the following procedures:  Procedures          Lee Lyn MD  12/16/24 0701

## 2024-12-16 NOTE — ED PROVIDER NOTES
Emergency Medicine Note  HPI   HISTORY OF PRESENT ILLNESS     59-year-old male with history of atrial fibrillation presents to the ED for evaluation of palpitations.  Patient states he awoke from sleep at 0200 tonight with racing, irregular heart palpitations.  Patient states he checked his Apple Watch which told him he was in A-fib in the 140s.  Patient states he took his as needed flecainide at 2:27 AM this morning.  Upon ED evaluation, pt reports improvement of sx.  Denies chest pain, shortness of breath, fevers, cough, congestion, runny nose, vomiting, black or bloody stools.  No anticoagulant use.  Followed by cardiology, Dr. May.  Patient states he did have a sip of wine last night but rarely drinks alcohol.  No new medications.      History provided by:  Patient   used: No          Patient History   PAST HISTORY     Reviewed from Nursing Triage:       Past Medical History:   Diagnosis Date    A-fib (CMS/[facility])     Folliculitis     Hyperlipemia     Hypertension        Past Surgical History   Procedure Laterality Date    CARDIOVERSION N/A 5/14/2018    Performed by Ezequiel May MD at [facility]    Elbow reconstruction      Knee surgery Bilateral     Vasectomy         Family History   Problem Relation Name Age of Onset    Hyperlipidemia Biological Mother      Hypertension Biological Mother         Social History[1]      Review of Systems   REVIEW OF SYSTEMS     Review of Systems   Constitutional:  Negative for appetite change, chills and fever.   HENT:  Negative for congestion, rhinorrhea and sore throat.    Respiratory:  Negative for cough and shortness of breath.    Cardiovascular:  Positive for palpitations. Negative for chest pain and leg swelling.   Gastrointestinal:  Negative for abdominal pain, blood in stool, nausea and vomiting.   Musculoskeletal:  Negative for arthralgias and myalgias.   Neurological:  Negative for syncope, weakness and light-headedness.         VITALS     ED  Vitals      Date/Time Temp Pulse Resp BP SpO2 [facility]   12/16/24 0626 -- 78 18 144/72 98 % [facility]   12/16/24 0422 -- 80 18 149/79 97 % [facility]   12/16/24 0347 -- 83 -- -- -- [facility]   12/16/24 0342 -- 104 -- -- -- [facility]   12/16/24 0336 -- 104 -- -- -- [facility]   12/16/24 0330 -- 126 20 172/92 99 % [facility]   12/16/24 0326 36.6 °C (97.8 °F) 120 18 154/91 100 % [facility]          Pulse Ox %: 97 % (12/16/24 0551)  Pulse Ox Interpretation: Normal (12/16/24 0551)  Heart Rate: 80 (12/16/24 0551)  Rhythm Strip Interpretation: Normal Sinus Rhythm (12/16/24 0551)     Physical Exam   PHYSICAL EXAM     Physical Exam  Vitals and nursing note reviewed.   Constitutional:       General: He is not in acute distress.     Appearance: Normal appearance. He is well-developed.   HENT:      Head: Normocephalic.   Eyes:      Conjunctiva/sclera: Conjunctivae normal.   Cardiovascular:      Rate and Rhythm: Tachycardia present. Rhythm irregularly irregular.   Pulmonary:      Effort: Pulmonary effort is normal.      Breath sounds: Normal breath sounds.   Abdominal:      Palpations: Abdomen is soft.      Tenderness: There is no abdominal tenderness. There is no guarding or rebound.   Musculoskeletal:         General: Normal range of motion.      Cervical back: Neck supple.      Right lower leg: No edema.      Left lower leg: No edema.   Skin:     General: Skin is warm and dry.      Findings: No rash.   Neurological:      Mental Status: He is alert and oriented to person, place, and time. Mental status is at baseline.           PROCEDURES     Procedures     DATA     Results       Procedure Component Value Units Date/Time    TSH w reflex FT4 [[id number]]  (Normal) Collected: 12/16/24 0334    Specimen: Blood, Venous Updated: 12/16/24 0454     TSH 2.78 mIU/L     HS Troponin I (with 2 hour reflex) [[id number]]  (Normal) Collected: 12/16/24 0334    Specimen: Blood, Venous Updated: 12/16/24 0446     High Sens Troponin I 4.5 pg/mL     Comprehensive metabolic panel [[id number]]  (Abnormal)  Collected: 12/16/24 0334    Specimen: Blood, Venous Updated: 12/16/24 0413     Sodium 139 mEQ/L      Potassium 3.4 mEQ/L      Comment: Results obtained on plasma. Plasma Potassium values may be up to 0.4 mEQ/L less than serum values. The differences may be greater for patients with high platelet or white cell counts.        Chloride 102 mEQ/L      CO2 30 mEQ/L      BUN 21 mg/dL      Creatinine 0.9 mg/dL      Glucose 117 mg/dL      Calcium 9.5 mg/dL      AST (SGOT) 21 IU/L      ALT (SGPT) 29 IU/L      Alkaline Phosphatase 46 IU/L      Total Protein 7.8 g/dL      Comment: Test performed on plasma which typically contains approximately 0.4 g/dL more protein than serum.        Albumin 4.7 g/dL      Bilirubin, Total 0.3 mg/dL      eGFR >60.0 mL/min/1.73m*2      Comment: Calculation based on the Chronic Kidney Disease Epidemiology Collaboration (CKD-EPI) equation refit without adjustment for race.        Anion Gap 7 mEQ/L     Magnesium [[id number]]  (Normal) Collected: 12/16/24 0334    Specimen: Blood, Venous Updated: 12/16/24 0413     Magnesium 2.0 mg/dL     CBC and differential [[id number]]  (Abnormal) Collected: 12/16/24 0334    Specimen: Blood, Venous Updated: 12/16/24 0341     WBC 10.26 K/uL      RBC 4.74 M/uL      Hemoglobin 13.9 g/dL      Hematocrit 43.1 %      MCV 90.9 fL      MCH 29.3 pg      MCHC 32.3 g/dL      RDW 13.7 %      Platelets 246 K/uL      MPV 9.4 fL      Differential Type Auto     nRBC 0.0 %      Immature Granulocytes 0.2 %      Neutrophils 47.0 %      Lymphocytes 41.6 %      Monocytes 9.2 %      Eosinophils 1.5 %      Basophils 0.5 %      Immature Granulocytes, Absolute 0.02 K/uL      Neutrophils, Absolute 4.83 K/uL      Lymphocytes, Absolute 4.27 K/uL      Monocytes, Absolute 0.94 K/uL      Eosinophils, Absolute 0.15 K/uL      Basophils, Absolute 0.05 K/uL             Imaging Results              X-RAY CHEST 1 VIEW (Final result)  Result time 12/16/24 12:25:26      Final result                    Impression:    IMPRESSION:  No acute disease in chest.    COMMENT:    Comparison: Chest x-ray 3/10/2021.    Technique: A single frontal AP portable upright projection of the chest was  obtained.    FINDINGS:    The lungs are clear without focal airspace consolidation, pleural effusion or  pneumothorax. The cardiomediastinal silhouette is within normal limits. The  upper abdomen is unremarkable. There is no acute osseous abnormality.                 Narrative:    CLINICAL HISTORY: afib                        Preliminary Interpretation    NAD    Interpreted contemporaneously by me with Dr. Lyn, ED attending.   Please refer to final result as interpreted by radiologist for complete detailed description/findings of the study.                                      ECG 12 lead   Independent Interpretation by ED Provider   Atrial fibrillation with rapid ventricular response at rate of 123. No STEMI.      ECG 12 lead   Independent Interpretation by ED Provider   EKG is normal sinus rhythm at a rate of 78.  Normal axis.  No STEMI.          Scoring tools                                  ED Course & MDM   MDM / ED COURSE / CLINICAL IMPRESSION / DISPO     Medical Decision Making  Problems Addressed:  Atrial fibrillation, unspecified type (CMS/HCC): acute illness or injury    Amount and/or Complexity of Data Reviewed  Labs: ordered.  Radiology: ordered and independent interpretation performed.  ECG/medicine tests: ordered and independent interpretation performed.        ED Course as of 12/18/24 2251   Mon Dec 16, 2024   0410 Converted to NSR [EG]   0519 Updated on results.  Remains in NSR.  Awaiting 2nd trop.   [EG]   0608 Case s/o at change of shift  [EG]      ED Course User Index  [EG] Rachel Carbajal PA C     Clinical Impression      Atrial fibrillation, unspecified type (CMS/HCC)     _________________       ED Disposition   Discharge                       [1]   Social History  Tobacco Use    Smoking status: Never     Smokeless tobacco: Never   Vaping Use    Vaping status: Never Used   Substance Use Topics    Alcohol use: Yes     Alcohol/week: 8.0 standard drinks of alcohol     Types: 8 Glasses of wine per week     Comment: 3-4 drinks - twice a week    Drug use: No        Rachel Carbajal PA C  12/18/24 1717

## 2024-12-17 LAB
ATRIAL RATE: 78
P AXIS: 14
PR INTERVAL: 196
QRS DURATION: 112
QRS DURATION: 126
QT INTERVAL: 288
QT INTERVAL: 422
QTC CALCULATION(BAZETT): 412
QTC CALCULATION(BAZETT): 481
R AXIS: 12
R AXIS: 30
T WAVE AXIS: 39
T WAVE AXIS: 41
VENTRICULAR RATE: 123
VENTRICULAR RATE: 78

## 2024-12-18 ASSESSMENT — ENCOUNTER SYMPTOMS
LIGHT-HEADEDNESS: 0
ABDOMINAL PAIN: 0
FEVER: 0
CHILLS: 0
VOMITING: 0
ARTHRALGIAS: 0
MYALGIAS: 0
COUGH: 0
WEAKNESS: 0
PALPITATIONS: 1
RHINORRHEA: 0
SHORTNESS OF BREATH: 0
APPETITE CHANGE: 0
SORE THROAT: 0
NAUSEA: 0
BLOOD IN STOOL: 0

## 2025-02-08 ENCOUNTER — APPOINTMENT (EMERGENCY)
Dept: RADIOLOGY | Facility: HOSPITAL | Age: 60
End: 2025-02-08
Payer: COMMERCIAL

## 2025-02-08 ENCOUNTER — HOSPITAL ENCOUNTER (EMERGENCY)
Facility: HOSPITAL | Age: 60
Discharge: HOME | End: 2025-02-08
Attending: EMERGENCY MEDICINE | Admitting: EMERGENCY MEDICINE
Payer: COMMERCIAL

## 2025-02-08 VITALS
DIASTOLIC BLOOD PRESSURE: 69 MMHG | TEMPERATURE: 98 F | SYSTOLIC BLOOD PRESSURE: 134 MMHG | OXYGEN SATURATION: 98 % | RESPIRATION RATE: 18 BRPM | HEART RATE: 69 BPM

## 2025-02-08 DIAGNOSIS — R20.2 PARESTHESIA: Primary | ICD-10-CM

## 2025-02-08 PROBLEM — R20.0 LEFT FACIAL NUMBNESS: Status: ACTIVE | Noted: 2025-02-08

## 2025-02-08 LAB
ALBUMIN SERPL-MCNC: 4.6 G/DL (ref 3.5–5.7)
ALP SERPL-CCNC: 51 IU/L (ref 34–125)
ALT SERPL-CCNC: 23 IU/L (ref 7–52)
ANION GAP SERPL CALC-SCNC: 9 MEQ/L (ref 3–15)
AST SERPL-CCNC: 21 IU/L (ref 13–39)
BASOPHILS # BLD: 0.05 K/UL (ref 0.01–0.1)
BASOPHILS NFR BLD: 0.5 %
BILIRUB SERPL-MCNC: 0.3 MG/DL (ref 0.3–1.2)
BUN SERPL-MCNC: 20 MG/DL (ref 7–25)
CALCIUM SERPL-MCNC: 9.8 MG/DL (ref 8.6–10.3)
CHLORIDE SERPL-SCNC: 102 MEQ/L (ref 98–107)
CO2 SERPL-SCNC: 26 MEQ/L (ref 21–31)
CREAT SERPL-MCNC: 0.9 MG/DL (ref 0.7–1.3)
DIFFERENTIAL METHOD BLD: ABNORMAL
EGFRCR SERPLBLD CKD-EPI 2021: >60 ML/MIN/1.73M*2
EOSINOPHIL # BLD: 0.02 K/UL (ref 0.04–0.54)
EOSINOPHIL NFR BLD: 0.2 %
ERYTHROCYTE [DISTWIDTH] IN BLOOD BY AUTOMATED COUNT: 13.7 % (ref 11.6–14.4)
GLUCOSE BLD-MCNC: 93 MG/DL (ref 70–99)
GLUCOSE SERPL-MCNC: 97 MG/DL (ref 70–99)
HCT VFR BLD AUTO: 42.1 % (ref 40.1–51)
HGB BLD-MCNC: 13.8 G/DL (ref 13.7–17.5)
IMM GRANULOCYTES # BLD AUTO: 0.04 K/UL (ref 0–0.08)
IMM GRANULOCYTES NFR BLD AUTO: 0.4 %
LYMPHOCYTES # BLD: 2.99 K/UL (ref 1.2–3.5)
LYMPHOCYTES NFR BLD: 31.4 %
MCH RBC QN AUTO: 28.8 PG (ref 28–33.2)
MCHC RBC AUTO-ENTMCNC: 32.8 G/DL (ref 32.2–36.5)
MCV RBC AUTO: 87.9 FL (ref 83–98)
MONOCYTES # BLD: 0.78 K/UL (ref 0.3–1)
MONOCYTES NFR BLD: 8.2 %
NEUTROPHILS # BLD: 5.64 K/UL (ref 1.7–7)
NEUTS SEG NFR BLD: 59.3 %
NRBC BLD-RTO: 0 %
PLATELET # BLD AUTO: 223 K/UL (ref 150–350)
PMV BLD AUTO: 9.2 FL (ref 9.4–12.4)
POCT TEST: NORMAL
POTASSIUM SERPL-SCNC: 3.9 MEQ/L (ref 3.5–5.1)
PROT SERPL-MCNC: 7.7 G/DL (ref 6–8.2)
RBC # BLD AUTO: 4.79 M/UL (ref 4.5–5.8)
SODIUM SERPL-SCNC: 137 MEQ/L (ref 136–145)
TROPONIN I SERPL HS-MCNC: 7.5 PG/ML
TROPONIN I SERPL HS-MCNC: 8 PG/ML
WBC # BLD AUTO: 9.52 K/UL (ref 3.8–10.5)

## 2025-02-08 PROCEDURE — 93005 ELECTROCARDIOGRAM TRACING: CPT | Performed by: EMERGENCY MEDICINE

## 2025-02-08 PROCEDURE — 84484 ASSAY OF TROPONIN QUANT: CPT | Performed by: EMERGENCY MEDICINE

## 2025-02-08 PROCEDURE — 99205 OFFICE O/P NEW HI 60 MIN: CPT | Performed by: STUDENT IN AN ORGANIZED HEALTH CARE EDUCATION/TRAINING PROGRAM

## 2025-02-08 PROCEDURE — 82040 ASSAY OF SERUM ALBUMIN: CPT | Performed by: EMERGENCY MEDICINE

## 2025-02-08 PROCEDURE — 70498 CT ANGIOGRAPHY NECK: CPT

## 2025-02-08 PROCEDURE — 36415 COLL VENOUS BLD VENIPUNCTURE: CPT | Performed by: EMERGENCY MEDICINE

## 2025-02-08 PROCEDURE — 70496 CT ANGIOGRAPHY HEAD: CPT

## 2025-02-08 PROCEDURE — 85025 COMPLETE CBC W/AUTO DIFF WBC: CPT | Performed by: EMERGENCY MEDICINE

## 2025-02-08 PROCEDURE — 70551 MRI BRAIN STEM W/O DYE: CPT

## 2025-02-08 PROCEDURE — 84484 ASSAY OF TROPONIN QUANT: CPT | Mod: 91 | Performed by: EMERGENCY MEDICINE

## 2025-02-08 PROCEDURE — 70450 CT HEAD/BRAIN W/O DYE: CPT | Mod: 59

## 2025-02-08 PROCEDURE — 63600105 HC IODINE BASED CONTRAST: Mod: JZ

## 2025-02-08 PROCEDURE — 63700000 HC SELF-ADMINISTRABLE DRUG

## 2025-02-08 PROCEDURE — 99284 EMERGENCY DEPT VISIT MOD MDM: CPT | Mod: 25

## 2025-02-08 RX ORDER — IOPAMIDOL 755 MG/ML
100 INJECTION, SOLUTION INTRAVASCULAR
Status: COMPLETED | OUTPATIENT
Start: 2025-02-08 | End: 2025-02-08

## 2025-02-08 RX ORDER — CLOPIDOGREL BISULFATE 300 MG/1
300 TABLET, FILM COATED ORAL ONCE
Status: COMPLETED | OUTPATIENT
Start: 2025-02-08 | End: 2025-02-08

## 2025-02-08 RX ORDER — NAPROXEN SODIUM 220 MG/1
81 TABLET, FILM COATED ORAL DAILY
Qty: 21 TABLET | Refills: 0 | Status: SHIPPED | OUTPATIENT
Start: 2025-02-08 | End: 2025-03-01

## 2025-02-08 RX ORDER — CLOPIDOGREL BISULFATE 75 MG/1
75 TABLET ORAL DAILY
Qty: 21 TABLET | Refills: 0 | Status: SHIPPED | OUTPATIENT
Start: 2025-02-08 | End: 2025-03-01

## 2025-02-08 RX ORDER — ASPIRIN 325 MG
325 TABLET ORAL ONCE
Status: COMPLETED | OUTPATIENT
Start: 2025-02-08 | End: 2025-02-08

## 2025-02-08 RX ADMIN — CLOPIDOGREL BISULFATE 300 MG: 300 TABLET, FILM COATED ORAL at 14:02

## 2025-02-08 RX ADMIN — ASPIRIN 325 MG ORAL TABLET 325 MG: 325 PILL ORAL at 14:02

## 2025-02-08 RX ADMIN — IOPAMIDOL 100 ML: 755 INJECTION, SOLUTION INTRAVENOUS at 13:43

## 2025-02-08 ASSESSMENT — ENCOUNTER SYMPTOMS
ABDOMINAL PAIN: 0
FREQUENCY: 0
DIZZINESS: 0
FLANK PAIN: 0
HEMATURIA: 0
NUMBNESS: 1
LIGHT-HEADEDNESS: 0
HEADACHES: 0
DYSURIA: 0
FEVER: 0
SHORTNESS OF BREATH: 0
CHILLS: 0

## 2025-02-08 NOTE — CONSULTS
[facility]  Neurology Department   INITIAL CONSULT NOTE     Patient:Usman Gottlieb   LOS: 0 days   Reason for consult/CC: left facial tingling   Requesting MD/Contact #:  Ashu Yarbrough DO       IMPRESSION/ RECOMMENDATION :   Usman Gottlieb is a 60 y.o. male with PMH notable for HTN, a fib not on AC, HTN, HLD on hospital day 0.  The Neurological issues being addressed in today's encounter are as follows:      #Left facial numbness, concerning for ischemic etiology  Patient presents with sudden onset left facial sensory changes concerning for stroke, for which we are consulted. On exam, with the exception of reported sensory changes he is neurologically intact. CTH/CTA were reviewed by me and are unremarkable. Pressures were notably elevated on arrival (184/88). Basic labs are unremarkable. Would recommend MRI brain w/o to look for evidence of stroke, though symptoms are mild and improving so suspect that study may likely be negative. Even if stroke, likely so small and acute that MRI is of low sensitivity.     Given his risk factors for stroke most prudent thing to do would be to treat as an ischemic event, though alternative etiologies certainly possible. In addition to imaging, patient will need to start antiplatelet therapy and follow-up with Cardiology for TTE and discussion regarding starting AC for his a fib. From my standpoint, he likely warrants starting anticoagulation.     Recommendations:  -MRI brain w/o  -If negative, would discharge on DAPT x 21 days (ABCD2 4), followed by Aspirin monotherapy; can d/c antiplatelets if starting AC  -F/u with Cardiology for TTE and initiation of AC vs long-term cardiac monitoring  -F/u with Me or Dr Charles in stroke clinic     Thank you for the consult. If you have any questions, please page me at [phone] or message me on Epic. We will follow-up MRI, and if normal sign off and see patient in clinic for follow-up.      Kalin Pina DO   Neurology    2:51 PM,  2/8/2025   _______________________________________________________________________________    HPI:   Mr Gottlieb is a 59 y/o with PMH as below who presented for sudden onset facial numbness/tingling for which Neurology was consulted. He says symptoms came on suddenly around 9 am, and may have also involved the left thumb. On my assessment, his facial numbness remains, lust around the left cheek and lips, but he otherwise denies any focal weakness, headache, vertigo, visual changes, difficulty swallowing, slurred speech, confusion, or word-finding difficulty. He has never had anything like this in the past. He denies any recent illness. He has not had anything like this happen in the past. He does report some increased stress this week surrounding job loss, though got a new job already.    Of note, he has a history of A fib with RVR, though says he is not on AC, because he has only had that happen twice and the episodes were 6 years apart.       Past Medical History:  Past Medical History:   Diagnosis Date    A-fib (CMS/HCC)     Folliculitis     Hyperlipemia     Hypertension       Past Surgical History   Procedure Laterality Date    CARDIOVERSION N/A 5/14/2018    Performed by Ezequiel May MD at [facility]    Elbow reconstruction      Knee surgery Bilateral     Vasectomy            Family History:   Family History   Problem Relation Name Age of Onset    Hyperlipidemia Biological Mother      Hypertension Biological Mother            Social History:   Social History     Tobacco Use    Smoking status: Never    Smokeless tobacco: Never   Vaping Use    Vaping status: Never Used   Substance Use Topics    Alcohol use: Yes     Alcohol/week: 8.0 standard drinks of alcohol     Types: 8 Glasses of wine per week     Comment: 3-4 drinks - twice a week    Drug use: No          Allergy:   No Known Allergies       ROS:   10 point ROS was negative except as noted in HPI    Medications   Medications Ordered Prior to Encounter[1]        Scheduled Meds:   Continuous Infusions:   PRN Meds:.       Physical Exam:   Vitals:    02/08/25 1321   BP: (!) 161/71   Pulse: 85   Resp: 18   Temp:    SpO2: 99%     General: NAD, not ill-appearing   HEENT: normocephalic, atraumatic   Resp: breathing non-labored   CV: RRR   Abd: soft, non-tender, non-distended       Neurologic Physical Exam:   Cognitive: Alert and oriented to self, situation, time, place, and current events, able to follow simple and complex commands, attends well to exam, good fund of knowledge, appropriate reasoning      Language: Speech spontaneous and fluent, no paraphasic errors, able to name objects, able to repeat       CN: PERRL, EOMI, visual fields full to finger count without extinction, symmetric smile and forehead raise, mild decreased sensation in left V3 distribution reported, hearing intact, palate symmetric, good muscle strength in SCM/Trap, tongue midline, no slurring of speech       Motor: Normal tone throughout, no atrophy present, 5/5 muscle strength in all major muscle groups UE & LE, no drift present     Sensory: intact diffusely to light touch    Coordination/Cerebellum: No dysmetria or ataxia noted on finger-to-nose or heel-to-shin b/l; no tremor         Review of Data  I reviewed the following data:   CBC Results         02/08/25 12/16/24 03/10/21     1346 0334 1144    WBC 9.52 10.26 7.56    RBC 4.79 4.74 4.89    HGB 13.8 13.9 14.4    HCT 42.1 43.1 44.2    MCV 87.9 90.9 90.4    MCH 28.8 29.3 29.4    MCHC 32.8 32.3 32.6     246 215          CMP Results         02/08/25 12/16/24 12/16/24     1346 1106 0334     141 139    K 3.9 4.0 3.4    Cl 102 102 102    CO2 26 30 30    Glucose 97 104 117    BUN 20 16 21    Creatinine 0.9 0.8 0.9    Calcium 9.8 9.8 9.5    Anion Gap 9 9 7    AST 21 24 21    ALT 23 31 29    Albumin 4.6 5.0 4.7    EGFR >60.0 >60.0 >60.0           Comment for K at 1346 on 02/08/25: Results obtained on plasma. Plasma Potassium values may be up to  0.4 mEQ/L less than serum values. The differences may be greater for patients with high platelet or white cell counts.    Comment for K at 1106 on 12/16/24: Results obtained on plasma. Plasma Potassium values may be up to 0.4 mEQ/L less than serum values. The differences may be greater for patients with high platelet or white cell counts.    Comment for K at 0334 on 12/16/24: Results obtained on plasma. Plasma Potassium values may be up to 0.4 mEQ/L less than serum values. The differences may be greater for patients with high platelet or white cell counts.    Comment for EGFR at 1346 on 02/08/25: Calculation based on the Chronic Kidney Disease Epidemiology Collaboration (CKD-EPI) equation refit without adjustment for race.    Comment for EGFR at 1106 on 12/16/24: Calculation based on the Chronic Kidney Disease Epidemiology Collaboration (CKD-EPI) equation refit without adjustment for race.    Comment for EGFR at 0334 on 12/16/24: Calculation based on the Chronic Kidney Disease Epidemiology Collaboration (CKD-EPI) equation refit without adjustment for race.                   CT ANGIOGRAPHY HEAD WITH AND WITHOUT IV CONTRAST    Result Date: 2/8/2025  IMPRESSION: 1. There is no measurable carotid or vertebral artery stenosis throughout the neck. 2. Within limits of resolution is technique, no intracranial aneurysms, vascular malformations, high-grade intracranial arterial stenoses or large vessel occlusions are demonstrated.     CT ANGIOGRAPHY NECK WITH AND WITHOUT IV CONTRAST    Result Date: 2/8/2025  IMPRESSION: 1. There is no measurable carotid or vertebral artery stenosis throughout the neck. 2. Within limits of resolution is technique, no intracranial aneurysms, vascular malformations, high-grade intracranial arterial stenoses or large vessel occlusions are demonstrated.     CT HEAD STROKE ALERT WITHOUT IV CONTRAST    Result Date: 2/8/2025  IMPRESSION: No acute intracranial abnormality. Finding:    Stroke alert    Acuity: Status:  CLOSED Critical read back was performed and results were read back by Lelia in the emergency department, on 2/8/2025 at 1:37 PM           [1]   No current facility-administered medications on file prior to encounter.     Current Outpatient Medications on File Prior to Encounter   Medication Sig Dispense Refill    amLODIPine (NORVASC) 5 mg tablet Take 5 mg by mouth once daily.  3    atorvastatin (LIPITOR) 40 mg tablet Take 40 mg by mouth once daily.  2

## 2025-02-08 NOTE — ED PROVIDER NOTES
Emergency Medicine Note  HPI   HISTORY OF PRESENT ILLNESS     Patient is a 60-year-old male with a past medical history of HTN, HLD and atrial fibrillation not on anticoagulation presenting to the emergency department for evaluation of subjective left-sided facial paresthesia that started around 9:00 AM.  Patient states that he played basketball this morning and shortly thereafter noticed to the symptoms.  He also reports some left thumb paresthesia prompting further evaluation in the emergency department.      History provided by:  Patient        Patient History   PAST HISTORY     Reviewed from Nursing Triage:       Past Medical History:   Diagnosis Date    A-fib (CMS/HCC)     Folliculitis     Hyperlipemia     Hypertension        Past Surgical History   Procedure Laterality Date    CARDIOVERSION N/A 5/14/2018    Performed by Ezequiel May MD at [facility]    Elbow reconstruction      Knee surgery Bilateral     Vasectomy         Family History   Problem Relation Name Age of Onset    Hyperlipidemia Biological Mother      Hypertension Biological Mother         Social History     Tobacco Use    Smoking status: Never    Smokeless tobacco: Never   Vaping Use    Vaping status: Never Used   Substance Use Topics    Alcohol use: Yes     Alcohol/week: 8.0 standard drinks of alcohol     Types: 8 Glasses of wine per week     Comment: 3-4 drinks - twice a week    Drug use: No         Review of Systems   REVIEW OF SYSTEMS     Review of Systems   Constitutional:  Negative for chills and fever.   Respiratory:  Negative for shortness of breath.    Cardiovascular:  Negative for chest pain.   Gastrointestinal:  Negative for abdominal pain.   Genitourinary:  Negative for dysuria, flank pain, frequency, hematuria and urgency.   Neurological:  Positive for numbness. Negative for dizziness, syncope, light-headedness and headaches.         VITALS     ED Vitals      Date/Time Temp Pulse Resp BP SpO2 [facility]   02/08/25 1709 -- 69 18 134/69 98 %  CC   02/08/25 1500 -- 78 18 148/67 99 % CC   02/08/25 1321 -- 85 18 161/71 99 % CC   02/08/25 1315 36.7 °C (98 °F) 82 18 184/88 99 % GC          Pulse Ox %: 99 % (02/08/25 1320)  Pulse Ox Interpretation: Normal (02/08/25 1320)  Heart Rate: 82 (02/08/25 1320)  Rhythm Strip Interpretation: Normal Sinus Rhythm (02/08/25 1320)     Physical Exam   PHYSICAL EXAM     Physical Exam  Vitals and nursing note reviewed.   Constitutional:       Appearance: Normal appearance.      Comments: Patient is in no acute distress.  He is sitting comfortably on the chair.  He answers questions appropriately and speaks in full sentences.  He makes good eye contact throughout examination.   HENT:      Head: Normocephalic and atraumatic.      Nose: Nose normal.      Mouth/Throat:      Mouth: Mucous membranes are moist.      Pharynx: Oropharynx is clear.   Eyes:      General: No visual field deficit.     Extraocular Movements: Extraocular movements intact.      Conjunctiva/sclera: Conjunctivae normal.      Pupils: Pupils are equal, round, and reactive to light.   Cardiovascular:      Rate and Rhythm: Normal rate and regular rhythm.   Pulmonary:      Effort: Pulmonary effort is normal.      Breath sounds: Normal breath sounds.   Musculoskeletal:         General: Normal range of motion.      Cervical back: Normal range of motion and neck supple.   Skin:     General: Skin is warm and dry.      Capillary Refill: Capillary refill takes less than 2 seconds.   Neurological:      Mental Status: He is alert.      Cranial Nerves: Cranial nerves 2-12 are intact. No dysarthria or facial asymmetry.      Sensory: Sensory deficit present.      Motor: Motor function is intact. No pronator drift.      Coordination: Coordination is intact. Finger-Nose-Finger Test normal.      Gait: Gait is intact.      Comments: Subjective sensory deficit to the left side of the face/left thumb.           PROCEDURES     Procedures     DATA     Results       Procedure Component  Value Units Date/Time    HS Troponin I (with 2 hour reflex) [[id number]]  (Normal) Collected: 02/08/25 1346    Specimen: Blood, Venous Updated: 02/08/25 1442     High Sens Troponin I 7.5 pg/mL     Comprehensive metabolic panel [[id number]]  (Normal) Collected: 02/08/25 1346    Specimen: Blood, Venous Updated: 02/08/25 1441     Sodium 137 mEQ/L      Potassium 3.9 mEQ/L      Comment: Results obtained on plasma. Plasma Potassium values may be up to 0.4 mEQ/L less than serum values. The differences may be greater for patients with high platelet or white cell counts.        Chloride 102 mEQ/L      CO2 26 mEQ/L      BUN 20 mg/dL      Creatinine 0.9 mg/dL      Glucose 97 mg/dL      Calcium 9.8 mg/dL      AST (SGOT) 21 IU/L      ALT (SGPT) 23 IU/L      Alkaline Phosphatase 51 IU/L      Total Protein 7.7 g/dL      Comment: Test performed on plasma which typically contains approximately 0.4 g/dL more protein than serum.        Albumin 4.6 g/dL      Bilirubin, Total 0.3 mg/dL      eGFR >60.0 mL/min/1.73m*2      Comment: Calculation based on the Chronic Kidney Disease Epidemiology Collaboration (CKD-EPI) equation refit without adjustment for race.        Anion Gap 9 mEQ/L     CBC and differential [[id number]]  (Abnormal) Collected: 02/08/25 1346    Specimen: Blood, Venous Updated: 02/08/25 1357     WBC 9.52 K/uL      RBC 4.79 M/uL      Hemoglobin 13.8 g/dL      Hematocrit 42.1 %      MCV 87.9 fL      MCH 28.8 pg      MCHC 32.8 g/dL      RDW 13.7 %      Platelets 223 K/uL      MPV 9.2 fL      Differential Type Auto     nRBC 0.0 %      Immature Granulocytes 0.4 %      Neutrophils 59.3 %      Lymphocytes 31.4 %      Monocytes 8.2 %      Eosinophils 0.2 %      Basophils 0.5 %      Immature Granulocytes, Absolute 0.04 K/uL      Neutrophils, Absolute 5.64 K/uL      Lymphocytes, Absolute 2.99 K/uL      Monocytes, Absolute 0.78 K/uL      Eosinophils, Absolute 0.02 K/uL      Basophils, Absolute 0.05 K/uL             Imaging Results               MRI BRAIN WITHOUT CONTRAST (Final result)  Result time 02/08/25 15:56:27      Final result                   Impression:    IMPRESSION:No evidence of acute infarction or other acute intracranial  pathology. Mild nonspecific white matter changes which are likely the sequela of  chronic small vessel ischemia given this patient's age.               Narrative:    CLINICAL HISTORY: Sudden onset of left facial numbness.    COMMENT:MRI of the brain was performed on February 8, 2025 utilizing a 1.5 Sandra  magnet. Comparison is made to CT brain and CT angiography of the head and neck  performed one hour prior. There are no previous brain MRIs for comparison.    The cortical sulci and ventricles are normal in caliber for the patient's age.  There are a few nonspecific foci of T2 prolongation scattered throughout the  white matter bilateral hemispheres which given the patient's age are most likely  the sequela of chronic small vessel ischemia. No extra-axial collections are  demonstrated and there is no midline shift. No mass lesions are demonstrated and  there is no mass effect within the brain. No foci of hemosiderin deposition or  restricted diffusion are demonstrated throughout the brain.    This mucosal thickening throughout scattered bilateral ethmoid air cells.  Remaining visualized paranasal sinuses and mastoid air cells are clear.                                       CT ANGIOGRAPHY HEAD WITH AND WITHOUT IV CONTRAST (Final result)  Result time 02/08/25 13:50:47      Final result                   Impression:    IMPRESSION:  1. There is no measurable carotid or vertebral artery stenosis throughout the  neck.  2. Within limits of resolution is technique, no intracranial aneurysms, vascular  malformations, high-grade intracranial arterial stenoses or large vessel  occlusions are demonstrated.                   Narrative:    CLINICAL HISTORY:Stroke alert. Neuro deficit, acute, stroke suspected. Left  facial  numbness.    COMMENT:Noncontrast CT the brain followed by CT angiography of the head and neck  was performed February 8, 2025. Thin section axial images were acquired during  the arterial phase of contrast bolus administration. Total of 100 cc of  Isovue-370 contrast was injected intravenously. Postprocessing was performed and  maximum intensity projection reformations were created and reviewed.    Comparison is made to a previous CT of the brain from April 24, 2019.  Correlation is made with CT of the brain performed prior to this study which was  dictated under separate cover by a separate radiologist.    CT DOSE:  One or more dose reduction techniques (e.g. automated exposure  control, adjustment of the mA and/or kV according to patient size, use of  iterative reconstruction technique) utilized for this examination.    The visualized aortic arch is patent, normal in caliber and without dissection.  The brachiocephalic artery is patent and without measurable stenosis or  dissection.    The right common carotid artery is patent, normal in caliber and without  stenosis or dissection. There is a small amount of calcified plaque extending  from the right carotid bulb into the proximal right internal carotid artery  however there is no measurable stenosis. The right internal and external carotid  arteries are patent and without measurable stenosis or dissection.    The left common carotid artery is patent and without measurable stenosis or  dissection. There are a few small foci of calcified plaque within the proximal  left internal carotid artery. There is no measurable stenosis. The left internal  and external carotid arteries are patent. Is no evidence of dissection.    The vertebral arteries are codominant in size and are patent throughout the neck  without evidence of stenosis or dissection to the vertebrobasilar junction.    The petrous, cavernous and supraclinoid segments of bilateral internal carotid  arteries  are patent and normal in caliber the left A1 segment is mildly  hypoplastic but patent. The right A1 segment is patent and normal in caliber as  are the anterior cerebral arteries.    Bilateral M1 segments and branches are patent and normal in caliber.    The vertebrobasilar junction, basilar artery and bilateral posterior cerebral  arteries are patent. The right P1 segment is hypoplastic. A right posterior  communicating artery is patent.    Within the limits of resolution of this technique, no intracranial aneurysms,  vascular malformations or high-grade cranial arterial stenoses are demonstrated.    Visualized soft tissue structures of the neck are unremarkable.    The visualized upper lung fields are clear.    The cervical and upper thoracic vertebra are normal in height and alignment.  There is straightening mild reversal the cervical lordosis which is nonspecific  and may be positional in nature. There is degenerative disc space and  spondylosis at C5-C6 and C6-C7.                                       CT ANGIOGRAPHY NECK WITH AND WITHOUT IV CONTRAST (Final result)  Result time 02/08/25 13:50:47      Final result                   Impression:    IMPRESSION:  1. There is no measurable carotid or vertebral artery stenosis throughout the  neck.  2. Within limits of resolution is technique, no intracranial aneurysms, vascular  malformations, high-grade intracranial arterial stenoses or large vessel  occlusions are demonstrated.                   Narrative:    CLINICAL HISTORY:Stroke alert. Neuro deficit, acute, stroke suspected. Left  facial numbness.    COMMENT:Noncontrast CT the brain followed by CT angiography of the head and neck  was performed February 8, 2025. Thin section axial images were acquired during  the arterial phase of contrast bolus administration. Total of 100 cc of  Isovue-370 contrast was injected intravenously. Postprocessing was performed and  maximum intensity projection reformations were  created and reviewed.    Comparison is made to a previous CT of the brain from April 24, 2019.  Correlation is made with CT of the brain performed prior to this study which was  dictated under separate cover by a separate radiologist.    CT DOSE:  One or more dose reduction techniques (e.g. automated exposure  control, adjustment of the mA and/or kV according to patient size, use of  iterative reconstruction technique) utilized for this examination.    The visualized aortic arch is patent, normal in caliber and without dissection.  The brachiocephalic artery is patent and without measurable stenosis or  dissection.    The right common carotid artery is patent, normal in caliber and without  stenosis or dissection. There is a small amount of calcified plaque extending  from the right carotid bulb into the proximal right internal carotid artery  however there is no measurable stenosis. The right internal and external carotid  arteries are patent and without measurable stenosis or dissection.    The left common carotid artery is patent and without measurable stenosis or  dissection. There are a few small foci of calcified plaque within the proximal  left internal carotid artery. There is no measurable stenosis. The left internal  and external carotid arteries are patent. Is no evidence of dissection.    The vertebral arteries are codominant in size and are patent throughout the neck  without evidence of stenosis or dissection to the vertebrobasilar junction.    The petrous, cavernous and supraclinoid segments of bilateral internal carotid  arteries are patent and normal in caliber the left A1 segment is mildly  hypoplastic but patent. The right A1 segment is patent and normal in caliber as  are the anterior cerebral arteries.    Bilateral M1 segments and branches are patent and normal in caliber.    The vertebrobasilar junction, basilar artery and bilateral posterior cerebral  arteries are patent. The right P1 segment  is hypoplastic. A right posterior  communicating artery is patent.    Within the limits of resolution of this technique, no intracranial aneurysms,  vascular malformations or high-grade cranial arterial stenoses are demonstrated.    Visualized soft tissue structures of the neck are unremarkable.    The visualized upper lung fields are clear.    The cervical and upper thoracic vertebra are normal in height and alignment.  There is straightening mild reversal the cervical lordosis which is nonspecific  and may be positional in nature. There is degenerative disc space and  spondylosis at C5-C6 and C6-C7.                                       CT HEAD STROKE ALERT WITHOUT IV CONTRAST (Final result)  Result time 02/08/25 13:38:26      Final result                   Impression:    IMPRESSION:    No acute intracranial abnormality.      Finding:    Stroke alert   Acuity: Status:  CLOSED    Critical read back was performed and results were read back by Lelia in the  emergency department, on 2/8/2025 at 1:37 PM                 Narrative:    CLINICAL HISTORY:    Left facial numbness    TECHNIQUE: CT of the head was performed without intravenous contrast. Coronal  and sagittal reformations were obtained.    CT DOSE:  One or more dose reduction techniques (e.g. automated exposure  control, adjustment of the mA and/or kV according to patient size, use of  iterative reconstruction technique) utilized for this examination.    COMPARISON: 4/24/2019.    COMMENT:    BRAIN: There is no acute intra or extra-axial hemorrhage, edema, or shift of  normal midline structures.  The ventricles are symmetric and do not appear  dilated.  There is no CT evidence of a large territorial infarct.  There is  minor periventricular low-density consistent with the sequela of chronic  microvascular disease.    SINUSES/ MASTOID AIR CELLS: Unremarkable    CALVARIUM: Unremarkable    ORBITS: The imaged intraorbital contents are normal    SCALP: Normal                                       ECG 12 lead   Independent Interpretation by ED Provider   Normal sinus rhythm 84 normal axis normal conduction QTc 465 no STEMI independently reviewed and interpreted by Ashu roldan DO.            Scoring tools                                  ED Course & MDM   MDM / ED COURSE / CLINICAL IMPRESSION / DISPO     Medical Decision Making  Problems Addressed:  Paresthesia: acute illness or injury    Amount and/or Complexity of Data Reviewed  External Data Reviewed: labs, radiology, ECG and notes.  Labs: ordered. Decision-making details documented in ED Course.  Radiology: ordered. Decision-making details documented in ED Course.  ECG/medicine tests: ordered and independent interpretation performed.  Discussion of management or test interpretation with external provider(s): Neurology    Risk  OTC drugs.  Prescription drug management.        ED Course as of 02/08/25 2205   Sat Feb 08, 2025   1320 Stroke alert called  [AS]   1321 9:00 AM subjective left facial paresthesia and left thumb  [AS]   1323 D/w Neuro, CT  Full dose ASA/Plavix 300 if neg, check MRI plain brain  [AS]   1336 Call from radiology, head CT neg [AS]   1342 CT HEAD STROKE ALERT WITHOUT IV CONTRAST  IMPRESSION:     No acute intracranial abnormality.   [AS]   1353 CT ANGIOGRAPHY NECK WITH AND WITHOUT IV CONTRAST  IMPRESSION:  1. There is no measurable carotid or vertebral artery stenosis throughout the  neck.  2. Within limits of resolution is technique, no intracranial aneurysms, vascular  malformations, high-grade intracranial arterial stenoses or large vessel  occlusions are demonstrated.   [AS]   1356 Updated patient with test results.  He states that he is not claustrophobic.  Aspirin Plavix ordered in addition to MRI brain [AS]   1401 CBC and differential(!)  No leukocytosis or anemia [AS]   1404 Ct head  No bleed  Independently reviewed and interpreted by Ashu roldan DO.   [BOY]   1441 Spoke with Dr. Pina,  recommended 81 mg ASA and next day cardiology and follow-up with neurology, possible TIA?  [AS]   1448 High Sens Troponin I: 7.5  1st [AS]   1448 Comprehensive metabolic panel  WNL [AS]   1600 MRI BRAIN WITHOUT CONTRAST  IMPRESSION:No evidence of acute infarction or other acute intracranial  pathology. Mild nonspecific white matter changes which are likely the sequela of  chronic small vessel ischemia given this patient's age.   [AS]   1618 Per Dr. Pina:     Recommendations:  -MRI brain w/o  -If negative, would discharge on DAPT x 21 days (ABCD2 4), followed by Aspirin monotherapy; can d/c antiplatelets if starting AC  -F/u with Cardiology for TTE and initiation of AC vs long-term cardiac monitoring  -F/u with Me or Dr Charles in stroke clinic   [AS]   1651 High Sens Troponin I: 8.0  2nd [AS]   1651 Will discharge patient with 21 days of Plavix.  Will recommend that he take 81 mg of aspirin in addition to the Plavix.  After the 21 days of Plavix are complete, he will only take aspirin.  Information for expedited cardiology follow-up sent to his cardiologist group.  He was given follow-up with neurology. [AS]   1651 Patient is now stable for discharge.  Return precautions discussed, patient verbalizes understanding.  Questions answered prior to discharge.   [AS]      ED Course User Index  [AS] Julisa Renee PA C  [BOY] Ashu Yarbrough, DO     Clinical Impression      Paresthesia     _________________       ED Disposition   Discharge                       Julisa Renee PA C  02/08/25 1096

## 2025-02-08 NOTE — DISCHARGE INSTRUCTIONS
Return to the ED for any chest pain, trouble breathing, nausea, sweating, vomiting, cough, fevers, weakness or numbness, or any worsening of symptoms.     You were prescribed aspirin and Plavix.  Please take the Plavix by mouth for 21 days in addition to 81 mg aspirin.  After completion of the Plavix, you will take ONLY aspirin, 81 mg daily.    Please follow-up with both the cardiologist and the neurologist for reevaluation and ongoing management of your symptoms.

## 2025-02-08 NOTE — ED ATTESTATION NOTE
Physician Attestation:     I have personally seen and examined the patient, participated in the management, and agree with the findings in the above note except as where stated.      I have personally performed the key components of the encounter and provided a substantive portion of the care and medical decision making.    The Physician Assistant and I discussed  the case, workup, and disposition.        Chief Complaint  Chief Complaint   Patient presents with    Paresthesia       My focused history, examination, assessment, and plan of care is as follows:      History  60 y.o. male with history of hypertension hyperlipidemia and not anticoagulated atrial fibrillation presents for evaluation of left facial paresthesias and left arm paresthesias that he noticed at 9 AM but could not give a definitive last known time      Physical  Vital signs reviewed   GCS 15  Alert and oriented  Cranial nerves II through XII intact  Normal speech without aphasia or dysarthria  No drift x 4 with equal coordinated movements   No dysmetria   No truncal ataxia  Sensory intact    MDM / Plan  -Patient historian/Independent historians: Patient  -Prior records reviewed: Labs CTA coronary  -Plan: Stroke alert called for completeness though I have limited suspicion that patient is having an acute stroke.  Case discussed with neurology Dr. Pina who recommends CT CTA aspirin Plavix if no bleeding and MRI  -The patient's chief complaint is an acute problem.    *Refer to ED Workup tab and PA chart for further documentation.     Ashu Yarbrough, DO  02/08/25 9458

## 2025-02-09 LAB
ATRIAL RATE: 84
P AXIS: 31
PR INTERVAL: 172
QRS DURATION: 108
QT INTERVAL: 394
QTC CALCULATION(BAZETT): 465
R AXIS: 13
T WAVE AXIS: 26
VENTRICULAR RATE: 84

## 2025-02-09 PROCEDURE — 93010 ELECTROCARDIOGRAM REPORT: CPT | Performed by: INTERNAL MEDICINE

## 2025-02-19 ENCOUNTER — TELEPHONE (OUTPATIENT)
Dept: NEUROLOGY | Facility: CLINIC | Age: 60
End: 2025-02-19
Payer: COMMERCIAL

## 2025-02-19 NOTE — TELEPHONE ENCOUNTER
Patient called to schedule an appointment with Dr. Charles/Stroke Clinic. He did not want to schedule it because the next available is 3/21. The patient will call back.

## 2025-02-21 ENCOUNTER — APPOINTMENT (EMERGENCY)
Dept: RADIOLOGY | Facility: HOSPITAL | Age: 60
End: 2025-02-21
Payer: COMMERCIAL

## 2025-02-21 ENCOUNTER — HOSPITAL ENCOUNTER (EMERGENCY)
Facility: HOSPITAL | Age: 60
Discharge: HOME | End: 2025-02-21
Attending: STUDENT IN AN ORGANIZED HEALTH CARE EDUCATION/TRAINING PROGRAM | Admitting: STUDENT IN AN ORGANIZED HEALTH CARE EDUCATION/TRAINING PROGRAM
Payer: COMMERCIAL

## 2025-02-21 VITALS
HEART RATE: 70 BPM | OXYGEN SATURATION: 98 % | SYSTOLIC BLOOD PRESSURE: 149 MMHG | RESPIRATION RATE: 16 BRPM | TEMPERATURE: 97.7 F | DIASTOLIC BLOOD PRESSURE: 65 MMHG

## 2025-02-21 DIAGNOSIS — I67.4 HYPERTENSIVE ENCEPHALOPATHY: Primary | ICD-10-CM

## 2025-02-21 PROBLEM — Z86.73 HISTORY OF TIA (TRANSIENT ISCHEMIC ATTACK): Status: ACTIVE | Noted: 2025-02-21

## 2025-02-21 PROBLEM — I10 ESSENTIAL HYPERTENSION: Status: ACTIVE | Noted: 2018-05-14

## 2025-02-21 LAB
ALBUMIN SERPL-MCNC: 4.8 G/DL (ref 3.5–5.7)
ALP SERPL-CCNC: 52 IU/L (ref 34–125)
ALT SERPL-CCNC: 30 IU/L (ref 7–52)
AMPHET UR QL SCN: NOT DETECTED
ANION GAP SERPL CALC-SCNC: 8 MEQ/L (ref 3–15)
AST SERPL-CCNC: 23 IU/L (ref 13–39)
ATRIAL RATE: 74
BARBITURATES UR QL SCN: NOT DETECTED
BASOPHILS # BLD: 0.04 K/UL (ref 0.01–0.1)
BASOPHILS NFR BLD: 0.5 %
BENZODIAZ UR QL SCN: NOT DETECTED
BILIRUB SERPL-MCNC: 0.6 MG/DL (ref 0.3–1.2)
BILIRUB UR QL STRIP.AUTO: NEGATIVE MG/DL
BNP SERPL-MCNC: 17 PG/ML
BUN SERPL-MCNC: 16 MG/DL (ref 7–25)
CALCIUM SERPL-MCNC: 10.3 MG/DL (ref 8.6–10.3)
CANNABINOIDS UR QL SCN: NOT DETECTED
CHLORIDE SERPL-SCNC: 101 MEQ/L (ref 98–107)
CLARITY UR REFRACT.AUTO: CLEAR
CO2 SERPL-SCNC: 30 MEQ/L (ref 21–31)
COCAINE UR QL SCN: NOT DETECTED
COLOR UR AUTO: YELLOW
CREAT SERPL-MCNC: 0.8 MG/DL (ref 0.7–1.3)
D DIMER PPP IA.FEU-MCNC: <0.27 UG/ML FEU (ref 0–0.5)
DIFFERENTIAL METHOD BLD: ABNORMAL
EGFRCR SERPLBLD CKD-EPI 2021: >60 ML/MIN/1.73M*2
EOSINOPHIL # BLD: 0.03 K/UL (ref 0.04–0.54)
EOSINOPHIL NFR BLD: 0.4 %
ERYTHROCYTE [DISTWIDTH] IN BLOOD BY AUTOMATED COUNT: 13.8 % (ref 11.6–14.4)
ERYTHROCYTE [SEDIMENTATION RATE] IN BLOOD BY WESTERGREN METHOD: 24 MM/HR
EST. AVERAGE GLUCOSE BLD GHB EST-MCNC: 114 MG/DL
FENTANYL CTO UR SCN-MCNC: NOT DETECTED NG/ML
GLUCOSE BLD-MCNC: 105 MG/DL (ref 70–99)
GLUCOSE SERPL-MCNC: 111 MG/DL (ref 70–99)
GLUCOSE UR STRIP.AUTO-MCNC: NEGATIVE MG/DL
HBA1C MFR BLD: 5.6 %
HCT VFR BLD AUTO: 46.9 % (ref 40.1–51)
HGB BLD-MCNC: 15.5 G/DL (ref 13.7–17.5)
HGB UR QL STRIP.AUTO: NEGATIVE
IMM GRANULOCYTES # BLD AUTO: 0.01 K/UL (ref 0–0.08)
IMM GRANULOCYTES NFR BLD AUTO: 0.1 %
KETONES UR STRIP.AUTO-MCNC: 1 MG/DL
LEUKOCYTE ESTERASE UR QL STRIP.AUTO: NEGATIVE
LYMPHOCYTES # BLD: 2.09 K/UL (ref 1.2–3.5)
LYMPHOCYTES NFR BLD: 28.6 %
MCH RBC QN AUTO: 29.7 PG (ref 28–33.2)
MCHC RBC AUTO-ENTMCNC: 33 G/DL (ref 32.2–36.5)
MCV RBC AUTO: 89.8 FL (ref 83–98)
MONOCYTES # BLD: 0.41 K/UL (ref 0.3–1)
MONOCYTES NFR BLD: 5.6 %
NEUTROPHILS # BLD: 4.74 K/UL (ref 1.7–7)
NEUTS SEG NFR BLD: 64.8 %
NITRITE UR QL STRIP.AUTO: NEGATIVE
NRBC BLD-RTO: 0 %
OPIATES UR QL SCN: NOT DETECTED
P AXIS: 33
PCP UR QL SCN: NOT DETECTED
PH UR STRIP.AUTO: 7 [PH]
PLATELET # BLD AUTO: 246 K/UL (ref 150–350)
PMV BLD AUTO: 9.1 FL (ref 9.4–12.4)
POCT TEST: ABNORMAL
POTASSIUM SERPL-SCNC: 4 MEQ/L (ref 3.5–5.1)
PR INTERVAL: 162
PROT SERPL-MCNC: 8.4 G/DL (ref 6–8.2)
PROT UR QL STRIP.AUTO: NEGATIVE
QRS DURATION: 104
QT INTERVAL: 410
QTC CALCULATION(BAZETT): 455
R AXIS: 9
RBC # BLD AUTO: 5.22 M/UL (ref 4.5–5.8)
SODIUM SERPL-SCNC: 139 MEQ/L (ref 136–145)
SP GR UR REFRACT.AUTO: 1.01
T WAVE AXIS: 35
TROPONIN I SERPL HS-MCNC: 3.8 PG/ML
TROPONIN I SERPL HS-MCNC: 3.8 PG/ML
UROBILINOGEN UR STRIP-ACNC: 0.2 EU/DL
VENTRICULAR RATE: 74
WBC # BLD AUTO: 7.32 K/UL (ref 3.8–10.5)

## 2025-02-21 PROCEDURE — 93005 ELECTROCARDIOGRAM TRACING: CPT

## 2025-02-21 PROCEDURE — 81003 URINALYSIS AUTO W/O SCOPE: CPT

## 2025-02-21 PROCEDURE — 85379 FIBRIN DEGRADATION QUANT: CPT | Performed by: STUDENT IN AN ORGANIZED HEALTH CARE EDUCATION/TRAINING PROGRAM

## 2025-02-21 PROCEDURE — 86592 SYPHILIS TEST NON-TREP QUAL: CPT | Performed by: STUDENT IN AN ORGANIZED HEALTH CARE EDUCATION/TRAINING PROGRAM

## 2025-02-21 PROCEDURE — 36415 COLL VENOUS BLD VENIPUNCTURE: CPT

## 2025-02-21 PROCEDURE — 85652 RBC SED RATE AUTOMATED: CPT | Performed by: STUDENT IN AN ORGANIZED HEALTH CARE EDUCATION/TRAINING PROGRAM

## 2025-02-21 PROCEDURE — 83036 HEMOGLOBIN GLYCOSYLATED A1C: CPT | Performed by: STUDENT IN AN ORGANIZED HEALTH CARE EDUCATION/TRAINING PROGRAM

## 2025-02-21 PROCEDURE — 93010 ELECTROCARDIOGRAM REPORT: CPT | Performed by: STUDENT IN AN ORGANIZED HEALTH CARE EDUCATION/TRAINING PROGRAM

## 2025-02-21 PROCEDURE — 84484 ASSAY OF TROPONIN QUANT: CPT

## 2025-02-21 PROCEDURE — 99215 OFFICE O/P EST HI 40 MIN: CPT | Performed by: STUDENT IN AN ORGANIZED HEALTH CARE EDUCATION/TRAINING PROGRAM

## 2025-02-21 PROCEDURE — 70551 MRI BRAIN STEM W/O DYE: CPT

## 2025-02-21 PROCEDURE — 85025 COMPLETE CBC W/AUTO DIFF WBC: CPT

## 2025-02-21 PROCEDURE — 80307 DRUG TEST PRSMV CHEM ANLYZR: CPT | Performed by: STUDENT IN AN ORGANIZED HEALTH CARE EDUCATION/TRAINING PROGRAM

## 2025-02-21 PROCEDURE — 99284 EMERGENCY DEPT VISIT MOD MDM: CPT | Mod: 25

## 2025-02-21 PROCEDURE — 80053 COMPREHEN METABOLIC PANEL: CPT

## 2025-02-21 PROCEDURE — 70450 CT HEAD/BRAIN W/O DYE: CPT

## 2025-02-21 PROCEDURE — 84484 ASSAY OF TROPONIN QUANT: CPT | Mod: 91

## 2025-02-21 PROCEDURE — 83695 ASSAY OF LIPOPROTEIN(A): CPT | Performed by: STUDENT IN AN ORGANIZED HEALTH CARE EDUCATION/TRAINING PROGRAM

## 2025-02-21 PROCEDURE — 83880 ASSAY OF NATRIURETIC PEPTIDE: CPT | Performed by: STUDENT IN AN ORGANIZED HEALTH CARE EDUCATION/TRAINING PROGRAM

## 2025-02-21 RX ORDER — METOPROLOL TARTRATE 25 MG/1
TABLET, FILM COATED ORAL
COMMUNITY

## 2025-02-21 RX ORDER — FLECAINIDE ACETATE 100 MG/1
TABLET ORAL
COMMUNITY

## 2025-02-22 LAB — LPA SERPL-SCNC: <10 NMOL/L

## 2025-02-24 LAB — RPR SER QL: NORMAL

## (undated) DEVICE — ***USE 120199***ELECTRODE HANDS FREE PACING/DEFIB/ECG